# Patient Record
Sex: FEMALE | Race: WHITE | NOT HISPANIC OR LATINO | Employment: PART TIME | URBAN - METROPOLITAN AREA
[De-identification: names, ages, dates, MRNs, and addresses within clinical notes are randomized per-mention and may not be internally consistent; named-entity substitution may affect disease eponyms.]

---

## 2017-03-18 ENCOUNTER — HOSPITAL ENCOUNTER (EMERGENCY)
Facility: HOSPITAL | Age: 32
Discharge: HOME | End: 2017-03-18
Attending: EMERGENCY MEDICINE | Admitting: EMERGENCY MEDICINE

## 2017-03-18 VITALS
HEART RATE: 65 BPM | BODY MASS INDEX: 17.72 KG/M2 | SYSTOLIC BLOOD PRESSURE: 118 MMHG | TEMPERATURE: 98 F | OXYGEN SATURATION: 99 % | WEIGHT: 100 LBS | RESPIRATION RATE: 16 BRPM | HEIGHT: 63 IN | DIASTOLIC BLOOD PRESSURE: 78 MMHG

## 2017-03-18 DIAGNOSIS — K13.70 ORAL LESION: Primary | ICD-10-CM

## 2017-03-18 PROCEDURE — 99283 EMERGENCY DEPT VISIT LOW MDM: CPT

## 2017-03-18 NOTE — ED PROVIDER NOTES
" EMERGENCY DEPARTMENT ENCOUNTER    CHIEF COMPLAINT  Chief Complaint: Mouth Lesions  History given by: Patient  History limited by:   Room Number: 02/02  PMD: GISELE Buckner      HPI:  Pt is a 32 y.o. female who presents complaining of mouth lesions that onset a week ago and is sublingual. Pt states the pain is worse with eating, talking, or movement of the tongue. Pt denies any fever or chills. She is scheduled to follow up with dentist in 2 days, but states the pain has become worse.    Duration:  1 week  Onset: gradual  Timing: constant  Location: sublingual  Radiation: none  Quality: \"sore under tongue\"  Intensity/Severity: moderate  Progression: unchanged  Associated Symptoms: none  Aggravating Factors: chewing, talking  Alleviating Factors: none  Previous Episodes: none  Treatment before arrival: none    PAST MEDICAL HISTORY  Active Ambulatory Problems     Diagnosis Date Noted   • No Active Ambulatory Problems     Resolved Ambulatory Problems     Diagnosis Date Noted   • No Resolved Ambulatory Problems     Past Medical History:   Diagnosis Date   • Kidney stones        PAST SURGICAL HISTORY  Past Surgical History:   Procedure Laterality Date   • NEPHRECTOMY         FAMILY HISTORY  Family History   Problem Relation Age of Onset   • Depression Mother    • Kidney disease Mother        SOCIAL HISTORY  Social History     Social History   • Marital status: Single     Spouse name: N/A   • Number of children: 2   • Years of education: N/A     Occupational History   • Not on file.     Social History Main Topics   • Smoking status: Current Every Day Smoker     Packs/day: 1.00   • Smokeless tobacco: Not on file   • Alcohol use Not on file      Comment: Occ.   • Drug use: No   • Sexual activity: Not on file     Other Topics Concern   • Not on file     Social History Narrative   • No narrative on file       ALLERGIES  Penicillins and Shellfish-derived products    REVIEW OF SYSTEMS  Review of Systems   Constitutional: " Negative.    HENT: Positive for mouth sores.    Respiratory: Negative.    Cardiovascular: Negative.    Gastrointestinal: Negative.    Genitourinary: Negative.    All other systems reviewed and are negative.      PHYSICAL EXAM  ED Triage Vitals   Temp Heart Rate Resp BP SpO2   03/18/17 1801 03/18/17 1801 03/18/17 1801 03/18/17 1824 03/18/17 1801   97.5 °F (36.4 °C) 75 18 124/68 99 %      Temp src Heart Rate Source Patient Position BP Location FiO2 (%)   -- -- -- -- --              Physical Exam   Constitutional: She is oriented to person, place, and time and well-developed, well-nourished, and in no distress.   HENT:   1cm papular region at the posterior tongue near the frenulum    Eyes: EOM are normal.   Neck: Normal range of motion.   Cardiovascular: Normal rate and regular rhythm.    Pulmonary/Chest: Effort normal and breath sounds normal. No respiratory distress.   Neurological: She is alert and oriented to person, place, and time. She has normal sensation and normal strength.   Skin: Skin is warm and dry.   Psychiatric: Affect normal.   Nursing note and vitals reviewed.    PROCEDURES  Procedures      PROGRESS AND CONSULTS  ED Course   8:28 PM  Informed pt of her need to follow up with DMD for further evaluation. Will give pain meds till she can follow up. Pt understands and agrees with plan. All concerns addressed   8:39 PM  Discussed pt with Dr. Vazquez. Dr. Vazquez has seen and evaluated pt and agrees with tx plan.       MEDICAL DECISION MAKING      MDM  Number of Diagnoses or Management Options  Oral lesion:          DIAGNOSIS  Final diagnoses:   Oral lesion       DISPOSITION  DISCHARGE    Patient discharged in stable condition.    Reviewed implications of results, diagnosis, meds, responsibility to follow up, warning signs and symptoms of possible worsening, potential complications and reasons to return to ER.    Patient/Family voiced understanding of above instructions.    Discussed plan for discharge, as  there is no emergent indication for admission.  Pt/family is agreeable and understands need for follow up and repeat testing.  Pt is aware that discharge does not mean that nothing is wrong but it indicates no emergency is present that requires admission and they must continue care with follow-up as given below or physician of their choice.     FOLLOW-UP  Your Dentist    Go in 2 days  As scheduled         Medication List      New Prescriptions          Miracle mouthwash oral suspension   Swish and spit 15 mL Every 4 (Four) Hours As Needed for stomatitis.         Stop          norgestimate-ethinyl estradiol 0.18/0.215/0.25 MG-35 MCG per tablet   Commonly known as:  ORTHO TRI-CYCLEN,TRINESSA       nystatin 074120 UNIT/GM cream   Commonly known as:  MYCOSTATIN               Latest Documented Vital Signs:  As of 5:39 AM  BP- 118/78 HR- 65 Temp- 98 °F (36.7 °C) O2 sat- 99%    --  Documentation assistance provided by melanie Archuleta for Ac Reid PA-C.  Information recorded by the scribe was done at my direction and has been verified and validated by me.       Joe Archuleta  03/18/17 2029       Joe Archuleta  03/18/17 2040       NASREEN Lugo III  03/23/17 [id number]

## 2017-03-18 NOTE — ED NOTES
Pt states she has a sore under her tongue that has been there for 1 week and increasing in pain     Mayo Blue RN  03/18/17 [id number]

## 2017-03-19 NOTE — ED PROVIDER NOTES
History    The patient presents complaining of mouth lesions which she initially noticed one week ago. She states that the pain is exacerbated with eating or talking and relieved with rest. Patient denies any fever since onset. She has follow up with her Dentist in two days.     On physical exam, slight irritation of the gum line of the mandibular teeth.      I supervised care provided by the midlevel provider.  We have discussed this patient's history, physical exam, and treatment plan. I have reviewed the note and personally saw and examined the patient and agree with the plan of care.    Documentation assistance provided by melanie Brown for .  Information recorded by the melanie was done at my direction and has been verified and validated by me.       Manolo Brwon  03/18/17 0379       Xavier Vazquez MD  03/18/17 [id number]

## 2017-03-19 NOTE — DISCHARGE INSTRUCTIONS
Return to the ER with any further concerns.  You may take OTC Ibuprofen in addition to the Magic Mouthwash.  Follow up with your dentist Monday as directed.

## 2017-03-22 ENCOUNTER — TELEPHONE (OUTPATIENT)
Dept: SOCIAL WORK | Facility: HOSPITAL | Age: 32
End: 2017-03-22

## 2017-03-22 NOTE — TELEPHONE ENCOUNTER
ED follow-up call. States she is using mouthwash as prescribed, feeling better and followed up w/ dentist

## 2017-08-08 ENCOUNTER — OFFICE VISIT (OUTPATIENT)
Dept: OBSTETRICS AND GYNECOLOGY | Facility: CLINIC | Age: 32
End: 2017-08-08

## 2017-08-08 ENCOUNTER — PROCEDURE VISIT (OUTPATIENT)
Dept: OBSTETRICS AND GYNECOLOGY | Facility: CLINIC | Age: 32
End: 2017-08-08

## 2017-08-08 VITALS
HEIGHT: 63 IN | DIASTOLIC BLOOD PRESSURE: 64 MMHG | BODY MASS INDEX: 17.58 KG/M2 | WEIGHT: 99.2 LBS | SYSTOLIC BLOOD PRESSURE: 108 MMHG

## 2017-08-08 DIAGNOSIS — N91.2 ABSENT MENSES: Primary | ICD-10-CM

## 2017-08-08 DIAGNOSIS — N89.8 VAGINAL DISCHARGE: ICD-10-CM

## 2017-08-08 DIAGNOSIS — N91.2 AMENORRHEA: Primary | ICD-10-CM

## 2017-08-08 LAB
B-HCG UR QL: NEGATIVE
BILIRUB BLD-MCNC: NEGATIVE MG/DL
GLUCOSE UR STRIP-MCNC: NEGATIVE MG/DL
INTERNAL NEGATIVE CONTROL: NEGATIVE
INTERNAL POSITIVE CONTROL: POSITIVE
KETONES UR QL: NEGATIVE
LEUKOCYTE EST, POC: NEGATIVE
Lab: NORMAL
NITRITE UR-MCNC: NEGATIVE MG/ML
PH UR: 7 [PH] (ref 5–8)
PROT UR STRIP-MCNC: NEGATIVE MG/DL
RBC # UR STRIP: ABNORMAL /UL
SP GR UR: 1.01 (ref 1–1.03)
UROBILINOGEN UR QL: NORMAL

## 2017-08-08 PROCEDURE — 99214 OFFICE O/P EST MOD 30 MIN: CPT | Performed by: NURSE PRACTITIONER

## 2017-08-08 PROCEDURE — 76830 TRANSVAGINAL US NON-OB: CPT | Performed by: NURSE PRACTITIONER

## 2017-08-08 PROCEDURE — 81025 URINE PREGNANCY TEST: CPT | Performed by: NURSE PRACTITIONER

## 2017-08-08 NOTE — PROGRESS NOTES
Subjective   Eula Yi is a 32 y.o. female presents with c/o missed period    History of Present Illness  Eula reports her last period was in May. She states she did stop taking OCPs around the same time as she was no longer sexually active and they were causing her nausea. She reports monthly withdrawal bleed on OCPs each month. Denies pelvic pain. Reports some cramping and acne. She has not been sexually active in several months but has taken several negative home pregnancy tests. Last pap ASCUS, HPV  Neg November 2016.    The following portions of the patient's history were reviewed and updated as appropriate: allergies, current medications, past family history, past medical history, past social history, past surgical history and problem list.    Review of Systems   Constitutional: Negative for chills, fatigue and fever.   Gastrointestinal: Negative for abdominal distention and abdominal pain.   Genitourinary: Positive for menstrual problem and vaginal discharge. Negative for difficulty urinating, dyspareunia, dysuria, flank pain, frequency, genital sores, pelvic pain, vaginal bleeding and vaginal pain.     Objective   Physical Exam  External genitalia WNL- negative for lesions, swelling, and skin discolorations  Vagina WNL- clear, no lesions  Cervix WNL- no lesions, discharge, or CMT  Uterus NSSC  freely mobile, nontender  Adnexa WNL- no masses or tenderness      Assessment/Plan   Eula was seen today for amenorrhea.    Diagnoses and all orders for this visit:    Amenorrhea  -     POC Pregnancy, Urine  -     POC Urinalysis Dipstick  -     TSH  -     Follicle Stimulating Hormone  -     Prolactin  -     HCG, B-subunit, Quantitative    Vaginal discharge  -     NuSwab VG+        Await labs and U/S. Pt to call in 1 week to discuss. Pt plans to continue abstinence for now.     Jimena Christianson, APRN

## 2017-08-09 LAB
FSH SERPL-ACNC: 5.7 MIU/ML
HCG INTACT+B SERPL-ACNC: <0.5 MIU/ML
PROLACTIN SERPL-MCNC: 55.7 NG/ML (ref 4.8–23.3)
TSH SERPL DL<=0.005 MIU/L-ACNC: 1 MIU/ML (ref 0.27–4.2)

## 2017-08-11 LAB
A VAGINAE DNA VAG QL NAA+PROBE: ABNORMAL SCORE
BVAB2 DNA VAG QL NAA+PROBE: ABNORMAL SCORE
C ALBICANS DNA VAG QL NAA+PROBE: NEGATIVE
C GLABRATA DNA VAG QL NAA+PROBE: NEGATIVE
C TRACH RRNA SPEC QL NAA+PROBE: NEGATIVE
MEGA1 DNA VAG QL NAA+PROBE: ABNORMAL SCORE
N GONORRHOEA RRNA SPEC QL NAA+PROBE: NEGATIVE
T VAGINALIS RRNA SPEC QL NAA+PROBE: NEGATIVE

## 2017-08-16 ENCOUNTER — TELEPHONE (OUTPATIENT)
Dept: OBSTETRICS AND GYNECOLOGY | Facility: CLINIC | Age: 32
End: 2017-08-16

## 2017-08-16 RX ORDER — METRONIDAZOLE 7.5 MG/G
GEL TOPICAL NIGHTLY
Qty: 45 G | Refills: 0 | Status: SHIPPED | OUTPATIENT
Start: 2017-08-16 | End: 2017-08-18 | Stop reason: SDUPTHER

## 2017-08-16 NOTE — TELEPHONE ENCOUNTER
Spoke to Eula. Informed + BV on culture and metrogel sent to pharm. Discussed labwork and ultrasound results. Recommend follow up with Dr. Mccoy to further discuss findings and amenorrhea. Probable repeat prolactin level at that time. Eula verbalizes understanding and agrees with plan. She will call to schedule appt with Dr. Mccoy tomorrow.

## 2017-08-18 RX ORDER — METRONIDAZOLE 500 MG/1
500 TABLET ORAL 2 TIMES DAILY
Qty: 14 TABLET | Refills: 0 | Status: SHIPPED | OUTPATIENT
Start: 2017-08-18 | End: 2017-08-25

## 2017-08-29 ENCOUNTER — OFFICE VISIT (OUTPATIENT)
Dept: OBSTETRICS AND GYNECOLOGY | Facility: CLINIC | Age: 32
End: 2017-08-29

## 2017-08-29 VITALS
HEIGHT: 63 IN | DIASTOLIC BLOOD PRESSURE: 58 MMHG | WEIGHT: 97.2 LBS | SYSTOLIC BLOOD PRESSURE: 100 MMHG | BODY MASS INDEX: 17.22 KG/M2

## 2017-08-29 DIAGNOSIS — E23.0: Primary | ICD-10-CM

## 2017-08-29 PROCEDURE — 99213 OFFICE O/P EST LOW 20 MIN: CPT | Performed by: OBSTETRICS & GYNECOLOGY

## 2017-08-29 NOTE — PROGRESS NOTES
Subjective   Eula Yi is a 32 y.o. female here for follow-up regarding amenorrhea     History of Present Illness    32-year-old multiparous white female presents after being evaluated for amenorrhea.  She also had evidence of bacterial vaginosis which was treated.  Her symptoms have improved.  Her prolactin level was 55.  Her TSH and FSH were normal.  She denies galactorrhea and currently is on no medications.    The following portions of the patient's history were reviewed and updated as appropriate: allergies, current medications, past family history, past medical history, past social history, past surgical history and problem list.    Review of Systems   Gastrointestinal: Negative for abdominal distention and abdominal pain.   Genitourinary: Positive for menstrual problem and vaginal discharge.   Neurological: Negative for headaches.       Objective   Physical Exam   She is alert and conversant and in no acute distress.  She is oriented to time and place.  Her abdomen is soft, flat and nontender.  No masses were appreciated.  The vulva and perineum are without lesion.  The vagina is without discharge or bleeding.  The cervix is without lesion and nontender to motion.  The uterus is anteverted and normal size and shape.  The adnexa are without mass or tenderness.    Assessment/Plan   Eula was seen today for follow-up.    Diagnoses and all orders for this visit:    Amenorrhea-hyperprolactinemia syndrome  -     Prolactin     We will recheck her prolactin level and if it still is elevated I recommended evaluation of her pituitary gland with either a CT scan or MRI.  She will call for results.  She is not interested in pregnancy and is considering permanent sterilization.

## 2017-08-30 LAB — PROLACTIN SERPL-MCNC: 30.6 NG/ML (ref 4.8–23.3)

## 2017-11-22 ENCOUNTER — OFFICE VISIT (OUTPATIENT)
Dept: OBSTETRICS AND GYNECOLOGY | Facility: CLINIC | Age: 32
End: 2017-11-22

## 2017-11-22 VITALS
DIASTOLIC BLOOD PRESSURE: 80 MMHG | SYSTOLIC BLOOD PRESSURE: 120 MMHG | HEIGHT: 63 IN | WEIGHT: 97 LBS | BODY MASS INDEX: 17.19 KG/M2

## 2017-11-22 DIAGNOSIS — Z01.411 ENCOUNTER FOR GYNECOLOGICAL EXAMINATION WITH ABNORMAL FINDING: Primary | ICD-10-CM

## 2017-11-22 DIAGNOSIS — B37.31 CANDIDA VAGINITIS: ICD-10-CM

## 2017-11-22 DIAGNOSIS — E23.0: ICD-10-CM

## 2017-11-22 PROCEDURE — 99213 OFFICE O/P EST LOW 20 MIN: CPT | Performed by: OBSTETRICS & GYNECOLOGY

## 2017-11-22 PROCEDURE — 99395 PREV VISIT EST AGE 18-39: CPT | Performed by: OBSTETRICS & GYNECOLOGY

## 2017-11-22 RX ORDER — FLUCONAZOLE 150 MG/1
150 TABLET ORAL DAILY
Qty: 1 TABLET | Refills: 1 | Status: SHIPPED | OUTPATIENT
Start: 2017-11-22 | End: 2018-03-06 | Stop reason: SDUPTHER

## 2017-11-22 NOTE — PROGRESS NOTES
Subjective     Eula Yi is a 32 y.o. female for annual gyn exam    History of Present Illness   32-year-old white female  3 para 2 presents for annual exam.  She has not had a menstrual cycle in over 8 months.  She was found to have an elevated prolactin level which had decreased from 55-30.  She denies galactorrhea.  She quit her oral contraceptives as she is not sexually active.  There is no family history of breast cancer.  She has no complaints.      The following portions of the patient's history were reviewed and updated as appropriate: allergies, current medications, past family history, past medical history, past social history, past surgical history and problem list.      Review of Systems   Constitutional: Negative for activity change, appetite change, fatigue and unexpected weight change.   HENT: Negative.    Eyes: Negative.    Respiratory: Negative.    Cardiovascular: Negative.    Gastrointestinal: Negative for abdominal distention, abdominal pain, anal bleeding, constipation, diarrhea, nausea and vomiting.   Endocrine: Negative for cold intolerance, heat intolerance, polydipsia, polyphagia and polyuria.   Genitourinary: Positive for menstrual problem. Negative for difficulty urinating, dysuria, flank pain, frequency, hematuria and urgency.   Musculoskeletal: Negative.    Skin: Negative.    Allergic/Immunologic: Negative.    Neurological: Negative.    Hematological: Negative.    Psychiatric/Behavioral: Negative.        Objective     Physical Exam   Constitutional: She is oriented to person, place, and time. Vital signs are normal. She appears well-developed and well-nourished. She is cooperative.   HENT:   Head: Normocephalic.   Eyes: Conjunctivae are normal. Pupils are equal, round, and reactive to light.   Neck: Normal range of motion. Neck supple. No tracheal deviation present. No thyromegaly present.   Cardiovascular: Normal rate, regular rhythm and normal heart sounds.  Exam reveals  no gallop and no friction rub.    No murmur heard.  Pulmonary/Chest: Effort normal and breath sounds normal. No respiratory distress. She has no wheezes. Right breast exhibits no inverted nipple, no mass, no nipple discharge, no skin change and no tenderness. Left breast exhibits no inverted nipple, no mass, no nipple discharge, no skin change and no tenderness. Breasts are symmetrical. There is no breast swelling.   Abdominal: Soft. Bowel sounds are normal. She exhibits no distension, no ascites and no mass. There is no hepatosplenomegaly. There is no tenderness. There is no rebound, no guarding and no CVA tenderness. No hernia. Hernia confirmed negative in the right inguinal area and confirmed negative in the left inguinal area.   Genitourinary: Pelvic exam was performed with patient supine. No labial fusion. There is no rash, tenderness, lesion or injury on the right labia. There is no rash, tenderness, lesion or injury on the left labia. Uterus is not deviated, not enlarged, not fixed and not tender. Cervix exhibits no motion tenderness, no discharge and no friability. Right adnexum displays no mass, no tenderness and no fullness. Left adnexum displays no mass, no tenderness and no fullness. No erythema, tenderness or bleeding in the vagina. No foreign body in the vagina. No signs of injury around the vagina. Vaginal discharge found.   Genitourinary Comments: There is a white, clumpy discharge consistent with Candida.   Musculoskeletal: Normal range of motion. She exhibits no edema or deformity.   Lymphadenopathy:     She has no cervical adenopathy.        Right: No inguinal adenopathy present.        Left: No inguinal adenopathy present.   Neurological: She is alert and oriented to person, place, and time. She has normal reflexes. No cranial nerve deficit. Coordination normal.   Skin: Skin is warm and dry. No rash noted. No erythema.   Psychiatric: She has a normal mood and affect. Her behavior is normal.          Eula was seen today for gynecologic exam.    Diagnoses and all orders for this visit:    Encounter for gynecological examination with abnormal finding  -     Pap IG, Rfx HPV ASCU - ThinPrep Vial, Cervix    Amenorrhea-hyperprolactinemia syndrome  -     Prolactin    Candida vaginitis  -     fluconazole (DIFLUCAN) 150 MG tablet; Take 1 tablet by mouth Daily.    The patient is doing well.  I encouraged tobacco cessation.  A regular diet and regular exercise were encouraged.  We will recheck her prolactin level.  I discussed possible bromocriptine therapy.  The patient is not currently sexually active and does not desire pregnancy.  She will call for test results.

## 2017-11-23 LAB — PROLACTIN SERPL-MCNC: 21.1 NG/ML (ref 4.8–23.3)

## 2017-11-25 LAB
CONV .: NORMAL
CYTOLOGIST CVX/VAG CYTO: NORMAL
CYTOLOGY CVX/VAG DOC THIN PREP: NORMAL
DX ICD CODE: NORMAL
HIV 1 & 2 AB SER-IMP: NORMAL
OTHER STN SPEC: NORMAL
PATH REPORT.FINAL DX SPEC: NORMAL
STAT OF ADQ CVX/VAG CYTO-IMP: NORMAL

## 2017-12-27 ENCOUNTER — TELEPHONE (OUTPATIENT)
Dept: OBSTETRICS AND GYNECOLOGY | Facility: CLINIC | Age: 32
End: 2017-12-27

## 2017-12-27 NOTE — TELEPHONE ENCOUNTER
Returned patient's call regarding her mychart message. She is currently at the [facility] for evaluation and workup of her symptoms.

## 2018-03-06 DIAGNOSIS — B37.31 CANDIDA VAGINITIS: ICD-10-CM

## 2018-03-07 RX ORDER — FLUCONAZOLE 150 MG/1
TABLET ORAL
Qty: 1 TABLET | Refills: 0 | Status: SHIPPED | OUTPATIENT
Start: 2018-03-07 | End: 2019-08-01 | Stop reason: SDUPTHER

## 2018-05-16 ENCOUNTER — OFFICE VISIT (OUTPATIENT)
Dept: OBSTETRICS AND GYNECOLOGY | Facility: CLINIC | Age: 33
End: 2018-05-16

## 2018-05-16 VITALS
BODY MASS INDEX: 17.54 KG/M2 | HEIGHT: 63 IN | DIASTOLIC BLOOD PRESSURE: 80 MMHG | SYSTOLIC BLOOD PRESSURE: 138 MMHG | WEIGHT: 99 LBS

## 2018-05-16 DIAGNOSIS — R39.9 UTI SYMPTOMS: Primary | ICD-10-CM

## 2018-05-16 DIAGNOSIS — B37.31 VAGINITIS DUE TO CANDIDA: ICD-10-CM

## 2018-05-16 LAB
BILIRUB BLD-MCNC: NEGATIVE MG/DL
GLUCOSE UR STRIP-MCNC: NEGATIVE MG/DL
KETONES UR QL: NEGATIVE
LEUKOCYTE EST, POC: NEGATIVE
NITRITE UR-MCNC: NEGATIVE MG/ML
PH UR: 7.5 [PH] (ref 5–8)
PROT UR STRIP-MCNC: NEGATIVE MG/DL
RBC # UR STRIP: NEGATIVE /UL
SP GR UR: 1.02 (ref 1–1.03)
UROBILINOGEN UR QL: NORMAL

## 2018-05-16 PROCEDURE — 99213 OFFICE O/P EST LOW 20 MIN: CPT | Performed by: OBSTETRICS & GYNECOLOGY

## 2018-05-16 RX ORDER — FLUCONAZOLE 150 MG/1
150 TABLET ORAL DAILY
Qty: 1 TABLET | Refills: 1 | Status: SHIPPED | OUTPATIENT
Start: 2018-05-16 | End: 2019-08-01 | Stop reason: SDUPTHER

## 2018-05-16 RX ORDER — LEVOTHYROXINE SODIUM 0.03 MG/1
25 TABLET ORAL DAILY
COMMUNITY
End: 2021-05-17

## 2018-05-16 NOTE — PROGRESS NOTES
Subjective   Eula Yi is a 33 y.o. female complaining of vaginal itching and discharge     History of Present Illness    33-year-old multiparous white female presents complaining of a vaginal discharge and itching after having intercourse and a condom breaking.  She is currently on oral contraceptives for pituitary dysfunction and is having regular menstrual cycles.  She is also having some urinary symptoms.  A urine dipstick here in the office was negative.    The following portions of the patient's history were reviewed and updated as appropriate: allergies, current medications, past family history, past medical history, past social history, past surgical history and problem list.    Review of Systems   Gastrointestinal: Negative for abdominal pain.   Genitourinary: Positive for urgency and vaginal discharge. Negative for pelvic pain and vaginal bleeding.       Objective   Physical Exam   She is alert and conversant and in no acute distress.  The abdomen is soft, flat and nontender.  No masses were palpable.  The vulva and perineum are without lesion.  The vagina has a thick white discharge consistent with Candida.  The cervix is grossly normal and nontender to motion.  No lesions were seen.  The uterus is anteverted and normal size and shape and nontender to motion.  The adnexa are without mass or tenderness.    Assessment/Plan   Eula was seen today for vaginal itching and urinary frequency.    Diagnoses and all orders for this visit:    UTI symptoms  -     POC Urinalysis Dipstick    Vaginitis due to Candida  -     fluconazole (DIFLUCAN) 150 MG tablet; Take 1 tablet by mouth Daily.  -     NuSwab VG+ - Swab, Vagina     E discussed her symptoms and the findings.  She will start Diflucan therapy and will call for testing results.

## 2018-05-23 LAB
A VAGINAE DNA VAG QL NAA+PROBE: NORMAL SCORE
BVAB2 DNA VAG QL NAA+PROBE: NORMAL SCORE
C ALBICANS DNA VAG QL NAA+PROBE: NEGATIVE
C GLABRATA DNA VAG QL NAA+PROBE: NEGATIVE
C TRACH RRNA SPEC QL NAA+PROBE: NEGATIVE
MEGA1 DNA VAG QL NAA+PROBE: NORMAL SCORE
N GONORRHOEA RRNA SPEC QL NAA+PROBE: NEGATIVE
T VAGINALIS RRNA SPEC QL NAA+PROBE: NEGATIVE

## 2018-10-02 ENCOUNTER — TELEPHONE (OUTPATIENT)
Dept: OBSTETRICS AND GYNECOLOGY | Facility: CLINIC | Age: 33
End: 2018-10-02

## 2018-10-02 NOTE — TELEPHONE ENCOUNTER
PT called, needs refill of boric acid capsules 600mg. Pharmacy has been sending a refill request.

## 2018-10-03 ENCOUNTER — TELEPHONE (OUTPATIENT)
Dept: OBSTETRICS AND GYNECOLOGY | Facility: CLINIC | Age: 33
End: 2018-10-03

## 2018-10-03 NOTE — TELEPHONE ENCOUNTER
PHARMACIST FROM [facility]. PHARMACY CALLED TO CHECK TO SEE IF YOU HAVE RECEIVED REFILL REQUEST FOR BORIC ACID VAGINAL CAPSUL, 600 MG.  COULD YOU PLEASE REFILL, OR DOES PT NEED TO BE SEEN.  PLEASE ADVISE.

## 2018-10-23 ENCOUNTER — OFFICE VISIT (OUTPATIENT)
Dept: OBSTETRICS AND GYNECOLOGY | Facility: CLINIC | Age: 33
End: 2018-10-23

## 2018-10-23 VITALS
DIASTOLIC BLOOD PRESSURE: 70 MMHG | HEIGHT: 63 IN | WEIGHT: 99 LBS | SYSTOLIC BLOOD PRESSURE: 100 MMHG | BODY MASS INDEX: 17.54 KG/M2

## 2018-10-23 DIAGNOSIS — N76.0 BACTERIAL VAGINOSIS: ICD-10-CM

## 2018-10-23 DIAGNOSIS — B96.89 BACTERIAL VAGINOSIS: ICD-10-CM

## 2018-10-23 DIAGNOSIS — Z01.419 WELL WOMAN EXAM WITH ROUTINE GYNECOLOGICAL EXAM: Primary | ICD-10-CM

## 2018-10-23 PROCEDURE — 99213 OFFICE O/P EST LOW 20 MIN: CPT | Performed by: NURSE PRACTITIONER

## 2018-10-23 PROCEDURE — 99395 PREV VISIT EST AGE 18-39: CPT | Performed by: NURSE PRACTITIONER

## 2018-10-23 RX ORDER — METRONIDAZOLE 500 MG/1
500 TABLET ORAL 2 TIMES DAILY
Qty: 14 TABLET | Refills: 0 | Status: SHIPPED | OUTPATIENT
Start: 2018-10-23 | End: 2018-10-30

## 2018-10-23 NOTE — PROGRESS NOTES
[facility], [facility]  Routine Annual Visit    10/23/2018    Patient: Eula Yi          MR#:[id number]      History of Present Illness    33 y.o. female  who presents with complaint of vaginal discharge and with request for pap smear today although one month early. Last pap negative 2017. Eula is seeing endocrinology for thyroid dysfunction and amenorrhea. Her cycle is currently controlled on OCPs. She c/o vaginal discharge and odor although much improved after boric acid therapy. Does request STI testing    No LMP recorded.  Obstetric History:  OB History      Para Term  AB Living    3 2 2     2    SAB TAB Ectopic Molar Multiple Live Births                        Menstrual History:     No LMP recorded.       Sexual History:       ________________________________________  Patient Active Problem List   Diagnosis   • Amenorrhea-hyperprolactinemia syndrome (CMS/HCC)       Past Medical History:   Diagnosis Date   • Amenorrhea    • Disease of thyroid gland    • Kidney stones        Past Surgical History:   Procedure Laterality Date   • KIDNEY STONE SURGERY     • NEPHRECTOMY     • SINUS SURGERY     • WISDOM TOOTH EXTRACTION         History   Smoking Status   • Current Every Day Smoker   • Packs/day: 1.00   Smokeless Tobacco   • Not on file       Family History   Problem Relation Age of Onset   • Depression Mother    • Kidney disease Mother    • Breast cancer Maternal Grandmother        Prior to Admission medications    Medication Sig Start Date End Date Taking? Authorizing Provider   levothyroxine (SYNTHROID, LEVOTHROID) 25 MCG tablet Take 25 mcg by mouth Daily.   Yes Provider, MD Milton   SPRINTEC 28 0.25-35 MG-MCG per tablet TAKE ONE TABLET BY MOUTH DAILY 16  Yes Patrick Mccoy MD   fluconazole (DIFLUCAN) 150 MG tablet TAKE ONE TABLET BY MOUTH IN A SINGLE DOSE 3/7/18   Patrick Mccoy MD   fluconazole (DIFLUCAN) 150 MG tablet Take 1 tablet by mouth Daily.  "5/16/18   Patrick Mccoy MD   metroNIDAZOLE (FLAGYL) 500 MG tablet Take 1 tablet by mouth 2 (Two) Times a Day for 7 days. Avoid alcohol during and 24 hours following therapy. 10/23/18 10/30/18  Jimena Christianson APRN   nystatin (MYCOSTATIN) 401599 UNIT/GM cream  10/31/16   ProviderMilton MD     ________________________________________  The following portions of the patient's history were reviewed and updated as appropriate: allergies, current medications, past family history, past medical history, past social history, past surgical history and problem list.    Review of Systems   Constitutional: Negative.    HENT: Negative.    Eyes: Negative for visual disturbance.   Respiratory: Negative for cough, shortness of breath and wheezing.    Cardiovascular: Negative for chest pain, palpitations and leg swelling.   Gastrointestinal: Negative for abdominal distention, abdominal pain, blood in stool, constipation, diarrhea, nausea and vomiting.   Endocrine: Negative for cold intolerance and heat intolerance.   Genitourinary: Negative for difficulty urinating, dyspareunia, dysuria, frequency, genital sores, hematuria, menstrual problem, pelvic pain, urgency, vaginal bleeding, vaginal discharge and vaginal pain.   Musculoskeletal: Negative.    Skin: Negative.    Neurological: Negative for dizziness, weakness, light-headedness, numbness and headaches.   Hematological: Negative.    Psychiatric/Behavioral: Negative.    Breasts: negative for lumps skin changes, dimpling, swelling, nipple changes/discharge bilaterally           Objective   Physical Exam    /70   Ht 160 cm (62.99\")   Wt 44.9 kg (99 lb)   BMI 17.54 kg/m²    BP Readings from Last 3 Encounters:   10/23/18 100/70   05/16/18 138/80   11/22/17 120/80      Wt Readings from Last 3 Encounters:   10/23/18 44.9 kg (99 lb)   05/16/18 44.9 kg (99 lb)   11/22/17 44 kg (97 lb)        BMI: Estimated body mass index is 17.54 kg/m² as calculated from the " "following:    Height as of this encounter: 160 cm (62.99\").    Weight as of this encounter: 44.9 kg (99 lb).       General:   alert, appears stated age and cooperative   Heart: regular rate and rhythm, S1, S2 normal, no murmur, click, rub or gallop   Lungs: clear to auscultation bilaterally   Abdomen: soft, non-tender, without masses or organomegaly   Breast: inspection negative, no nipple discharge or bleeding, no masses or nodularity palpable   Vulva: normal   Vagina: normal mucosa, thin grey discharge   Cervix: no cervical motion tenderness and no lesions   Uterus: normal size, mobile, non-tender or normal shape and consistency   Adnexa: no mass, fullness, tenderness     Assessment:    1. Normal annual exam   2. Vaginal discharge- treat with metronidazole while awaiting culture  3.  Counseled on healthy lifestyle modifications, safe sex, condom use, and self breast exams.      Plan:    []  Rx:   []  Mammogram request made  []  PAP done  []  Occult fecal blood test (Insure)  []  Labs:   []  GC/Chl/TV  []  DEXA scan   []  Referral for colonoscopy:           Jimena Christianson, GISELE  10/23/2018 1:51 PM  "

## 2018-10-26 ENCOUNTER — TELEPHONE (OUTPATIENT)
Dept: OBSTETRICS AND GYNECOLOGY | Facility: CLINIC | Age: 33
End: 2018-10-26

## 2018-10-26 LAB
A VAGINAE DNA VAG QL NAA+PROBE: ABNORMAL SCORE
BVAB2 DNA VAG QL NAA+PROBE: ABNORMAL SCORE
C ALBICANS DNA VAG QL NAA+PROBE: NEGATIVE
C GLABRATA DNA VAG QL NAA+PROBE: NEGATIVE
C TRACH RRNA SPEC QL NAA+PROBE: NEGATIVE
CYTOLOGIST CVX/VAG CYTO: ABNORMAL
CYTOLOGY CVX/VAG DOC THIN PREP: ABNORMAL
DX ICD CODE: ABNORMAL
HIV 1 & 2 AB SER-IMP: ABNORMAL
HPV I/H RISK 4 DNA CVX QL PROBE+SIG AMP: POSITIVE
MEGA1 DNA VAG QL NAA+PROBE: ABNORMAL SCORE
N GONORRHOEA RRNA SPEC QL NAA+PROBE: NEGATIVE
OTHER STN SPEC: ABNORMAL
PATH REPORT.FINAL DX SPEC: ABNORMAL
STAT OF ADQ CVX/VAG CYTO-IMP: ABNORMAL
T VAGINALIS RRNA SPEC QL NAA+PROBE: NEGATIVE

## 2018-10-26 NOTE — TELEPHONE ENCOUNTER
LVM informing pt that culture returned pos for BV as they suspected and that all other infections were neg. Told pt flagyl that was prescribed was the correct treatment.   ----- Message from GISELE Burton sent at 10/26/2018  8:46 AM EDT -----  Please inform pt BV returned positive on her culture as we suspected- all other infections negative. Flagyl prescribed after her visit was the appropriate treatment

## 2018-10-29 ENCOUNTER — TELEPHONE (OUTPATIENT)
Dept: OBSTETRICS AND GYNECOLOGY | Facility: CLINIC | Age: 33
End: 2018-10-29

## 2018-10-29 NOTE — TELEPHONE ENCOUNTER
Spoke with pt informed of pap coming back negative for any precancerous cells, but that the HPV was pos. Informed pt of all comments below per Jimena Christianson and how important it is to repeat her pap in a year. Pt stated understanding.   ----- Message from GISELE Burton sent at 10/29/2018 10:51 AM EDT -----  Please inform pt her PAP test was negative for any precancerous cell changes. A test for HPV (human papilloma virus) was also ran. This returned positive. This is a very common virus that nearly all sexually active women get at some point in their lives. It can cause cancer of the cervix or more commonly precancerous changes. It takes years for these changes to occur. In most cases, the immune system clears the virus from the genital tract in 12-24 months. It is important that you have your pap smear and this test repeated in 1 year.

## 2019-04-01 ENCOUNTER — OFFICE VISIT (OUTPATIENT)
Dept: OBSTETRICS AND GYNECOLOGY | Facility: CLINIC | Age: 34
End: 2019-04-01

## 2019-04-01 VITALS
DIASTOLIC BLOOD PRESSURE: 70 MMHG | BODY MASS INDEX: 18.07 KG/M2 | WEIGHT: 102 LBS | HEIGHT: 63 IN | SYSTOLIC BLOOD PRESSURE: 100 MMHG

## 2019-04-01 DIAGNOSIS — N89.8 VAGINAL DISCHARGE: Primary | ICD-10-CM

## 2019-04-01 PROCEDURE — 99213 OFFICE O/P EST LOW 20 MIN: CPT | Performed by: OBSTETRICS & GYNECOLOGY

## 2019-04-01 NOTE — PROGRESS NOTES
Subjective   Eula Yi is a 34 y.o. female with vaginal itching and discharge    History of Present Illness    34-year-old white female presents with a 3-week history of vaginal discharge with itching.  She had intercourse with her ex- and developed symptoms soon thereafter.  She has been maintained on oral contraceptive pills and started on thyroid replacement therapy due to significant pituitary dysfunction.  She has just recently started her menstrual cycle which is on time and normal.    The following portions of the patient's history were reviewed and updated as appropriate: allergies, current medications, past family history, past medical history, past social history, past surgical history and problem list.    Review of Systems   Gastrointestinal: Negative for abdominal distention and abdominal pain.   Genitourinary: Positive for vaginal bleeding and vaginal discharge. Negative for difficulty urinating, dysuria, frequency and pelvic pain.       Objective   Physical Exam   Patient is alert and conversant and in no acute distress.  The abdomen is soft, flat and nontender.  No masses were palpable.  The vulva and perineum are without lesion.  The vagina has a moderate amount of menstrual blood in the vault.  The cervix is grossly normal without lesion and nontender to motion.  The uterus is small and mobile and nontender.  The adnexa are without mass or tenderness.    Assessment/Plan   Eula was seen today for follow-up.    Diagnoses and all orders for this visit:    Vaginal discharge  -     NuSwab VG+ - Swab, Vagina    I was unable to discern the etiology of her discharge due to the vaginal bleeding.  A swab was performed and she will call for results.

## 2019-04-03 LAB
A VAGINAE DNA VAG QL NAA+PROBE: ABNORMAL SCORE
BVAB2 DNA VAG QL NAA+PROBE: ABNORMAL SCORE
C ALBICANS DNA VAG QL NAA+PROBE: NEGATIVE
C GLABRATA DNA VAG QL NAA+PROBE: NEGATIVE
C TRACH RRNA SPEC QL NAA+PROBE: NEGATIVE
MEGA1 DNA VAG QL NAA+PROBE: ABNORMAL SCORE
N GONORRHOEA RRNA SPEC QL NAA+PROBE: NEGATIVE
T VAGINALIS RRNA SPEC QL NAA+PROBE: POSITIVE

## 2019-04-04 RX ORDER — METRONIDAZOLE 500 MG/1
500 TABLET ORAL 2 TIMES DAILY
Qty: 14 TABLET | Refills: 0 | Status: SHIPPED | OUTPATIENT
Start: 2019-04-04 | End: 2019-06-10 | Stop reason: SDUPTHER

## 2019-06-12 RX ORDER — METRONIDAZOLE 500 MG/1
TABLET ORAL
Qty: 14 TABLET | Refills: 0 | Status: SHIPPED | OUTPATIENT
Start: 2019-06-12 | End: 2021-05-17

## 2019-07-18 ENCOUNTER — TELEPHONE (OUTPATIENT)
Dept: OBSTETRICS AND GYNECOLOGY | Facility: CLINIC | Age: 34
End: 2019-07-18

## 2019-07-18 NOTE — TELEPHONE ENCOUNTER
No available appointments for gyn visit till August. Patient believes she has possible uti and would like to be seen sooner. Can she be worked in ?

## 2019-07-18 NOTE — TELEPHONE ENCOUNTER
I am out of the office tomorrow and Monday and will be back until next Wednesday.  She probably needs to be seen sooner than that

## 2019-08-01 ENCOUNTER — OFFICE VISIT (OUTPATIENT)
Dept: OBSTETRICS AND GYNECOLOGY | Facility: CLINIC | Age: 34
End: 2019-08-01

## 2019-08-01 VITALS
SYSTOLIC BLOOD PRESSURE: 100 MMHG | DIASTOLIC BLOOD PRESSURE: 70 MMHG | HEIGHT: 63 IN | WEIGHT: 106 LBS | BODY MASS INDEX: 18.78 KG/M2

## 2019-08-01 DIAGNOSIS — N89.8 VAGINAL DISCHARGE: ICD-10-CM

## 2019-08-01 DIAGNOSIS — N91.2 AMENORRHEA DUE TO ORAL CONTRACEPTIVE: ICD-10-CM

## 2019-08-01 DIAGNOSIS — Z79.3 AMENORRHEA DUE TO ORAL CONTRACEPTIVE: ICD-10-CM

## 2019-08-01 DIAGNOSIS — B37.31 CANDIDA VAGINITIS: Primary | ICD-10-CM

## 2019-08-01 DIAGNOSIS — N91.2 ABSENT MENSES: ICD-10-CM

## 2019-08-01 PROCEDURE — 99213 OFFICE O/P EST LOW 20 MIN: CPT | Performed by: OBSTETRICS & GYNECOLOGY

## 2019-08-01 RX ORDER — FLUCONAZOLE 150 MG/1
150 TABLET ORAL DAILY
Qty: 1 TABLET | Refills: 1 | Status: SHIPPED | OUTPATIENT
Start: 2019-08-01 | End: 2021-05-17

## 2019-08-01 NOTE — PROGRESS NOTES
Subjective   Eula Yi is a 34 y.o. female with vaginal itching and discharge    History of Present Illness    34-year-old multiparous white female presents with a one-week history of vaginal discharge with itching.  She has noticed some urinary frequency but no dysuria.  Her symptoms began after she had intercourse and the condom broke.  She is currently using oral contraceptive pills and her last menstrual cycle consisted only of spotting for a couple of days.  She denies any symptoms of pregnancy but would like a urine pregnancy test.  She has not missed any of her birth control pills.    The following portions of the patient's history were reviewed and updated as appropriate: allergies, current medications, past family history, past medical history, past social history, past surgical history and problem list.    Review of Systems   Gastrointestinal: Negative for abdominal distention and abdominal pain.   Genitourinary: Positive for frequency and vaginal discharge. Negative for difficulty urinating, dysuria, genital sores, hematuria, pelvic pain, urgency and vaginal bleeding.       Objective   Physical Exam   Patient is alert and conversant and in no acute distress.  The abdomen is soft, flat and nontender.  No masses were palpable.  The vulva and perineum are without lesion.  The vagina has a copious, foul-smelling discharge with large white clumps consistent with Candida.  The cervix is multiparous without lesion.  Cervix is nontender to motion.  Uterus is anteverted and normal size and shape and contour.  The adnexa are without mass or tenderness.    Assessment/Plan   Eula was seen today for follow-up.    Diagnoses and all orders for this visit:    Candida vaginitis  -     fluconazole (DIFLUCAN) 150 MG tablet; Take 1 tablet by mouth Daily.    Vaginal discharge  -     NuSwab VG+ - Swab, Vagina    Amenorrhea due to oral contraceptive    We discussed her symptoms and the findings.  I recommended we start  with fluconazole therapy.  We will await further discharge testing.  Her urine pregnancy test in the office today was negative.  She has already restarted her new pack of oral contraceptive pills.  She will call for test results.

## 2019-08-06 ENCOUNTER — TELEPHONE (OUTPATIENT)
Dept: OBSTETRICS AND GYNECOLOGY | Facility: CLINIC | Age: 34
End: 2019-08-06

## 2019-08-07 ENCOUNTER — TELEPHONE (OUTPATIENT)
Dept: OBSTETRICS AND GYNECOLOGY | Facility: CLINIC | Age: 34
End: 2019-08-07

## 2019-08-07 RX ORDER — TINIDAZOLE 500 MG/1
1 TABLET ORAL
Qty: 10 TABLET | Refills: 0 | Status: SHIPPED | OUTPATIENT
Start: 2019-08-07 | End: 2019-08-12

## 2019-08-07 NOTE — TELEPHONE ENCOUNTER
We discussed the test results which showed mild bacterial vaginosis and she would like to try Tindamax since she has taken Flagyl numerous times with evidence of recurrence.  A prescription will be sent to her pharmacy.

## 2019-08-07 NOTE — TELEPHONE ENCOUNTER
Patient calling again about lab results, she feels she needs an antibiotic.  She can be reached at [phone].

## 2020-02-26 ENCOUNTER — OFFICE VISIT (OUTPATIENT)
Dept: OBSTETRICS AND GYNECOLOGY | Facility: CLINIC | Age: 35
End: 2020-02-26

## 2020-02-26 VITALS
DIASTOLIC BLOOD PRESSURE: 79 MMHG | HEIGHT: 63 IN | BODY MASS INDEX: 17.89 KG/M2 | SYSTOLIC BLOOD PRESSURE: 142 MMHG | WEIGHT: 101 LBS

## 2020-02-26 DIAGNOSIS — N89.8 VAGINAL DISCHARGE: Primary | ICD-10-CM

## 2020-02-26 DIAGNOSIS — N92.1 BREAKTHROUGH BLEEDING ON BIRTH CONTROL PILLS: ICD-10-CM

## 2020-02-26 PROCEDURE — 99213 OFFICE O/P EST LOW 20 MIN: CPT | Performed by: OBSTETRICS & GYNECOLOGY

## 2020-02-26 NOTE — PROGRESS NOTES
Subjective   Eula Yi is a 34 y.o. female with vaginal discharge and breakthrough bleeding    History of Present Illness    34-year-old multiparous white female who is currently on oral contraceptive pills complains of a vaginal discharge with odor over the past week.  She has also had some breakthrough bleeding on her oral contraceptive pills.  She believes her thyroid medication is not controlling her thyroid as she is losing weight.  She missed a pill in January but had a normal cycle the beginning of February.  She is due to start any day.    The following portions of the patient's history were reviewed and updated as appropriate: allergies, current medications, past family history, past medical history, past social history, past surgical history and problem list.    Review of Systems   Genitourinary: Positive for vaginal bleeding and vaginal discharge. Negative for pelvic pain.       Objective   Physical Exam   The patient is alert and conversant and in no acute distress.  She weighs 101 pounds today.  The abdomen is soft, flat and nontender.  No masses are palpable.  The vulva and perineum are without lesion.  The vagina has a small amount of menstrual blood in the vault.  The cervix is without lesion and nontender to motion.  Uterus is anteverted and normal size and shape and contour.  Adnexa are without mass or tenderness.    Assessment/Plan   Eula was seen today for follow-up.    Diagnoses and all orders for this visit:    Vaginal discharge  -     NuSwab VG+ - Swab, Vagina    Breakthrough bleeding on birth control pills    We discussed her symptoms and the findings.  A vaginal swab was sent for testing.  She will follow-up with her primary care physician regarding her thyroid issues.  She will call for results.

## 2020-02-29 LAB
A VAGINAE DNA VAG QL NAA+PROBE: ABNORMAL SCORE
BVAB2 DNA VAG QL NAA+PROBE: ABNORMAL SCORE
C ALBICANS DNA VAG QL NAA+PROBE: NEGATIVE
C GLABRATA DNA VAG QL NAA+PROBE: NEGATIVE
C TRACH DNA VAG QL NAA+PROBE: NEGATIVE
MEGA1 DNA VAG QL NAA+PROBE: ABNORMAL SCORE
N GONORRHOEA DNA VAG QL NAA+PROBE: NEGATIVE
T VAGINALIS DNA VAG QL NAA+PROBE: NEGATIVE

## 2020-03-02 RX ORDER — TINIDAZOLE 500 MG/1
1 TABLET ORAL DAILY
Qty: 10 TABLET | Refills: 0 | Status: SHIPPED | OUTPATIENT
Start: 2020-03-02 | End: 2020-03-07

## 2020-05-18 ENCOUNTER — TELEPHONE (OUTPATIENT)
Dept: OBSTETRICS AND GYNECOLOGY | Facility: CLINIC | Age: 35
End: 2020-05-18

## 2020-05-18 NOTE — TELEPHONE ENCOUNTER
Good Morning,    Patient believes she has a UTI. States she is having troubles going to the bathroom. She has taken some cranberry pills and drank some juice and it seems to have helped with the symptoms but she still seems to be irritated. Would like for her to come in and leave a sample?     Please advise, thank you.   Luba

## 2020-05-19 ENCOUNTER — CLINICAL SUPPORT (OUTPATIENT)
Dept: OBSTETRICS AND GYNECOLOGY | Facility: CLINIC | Age: 35
End: 2020-05-19

## 2020-05-19 DIAGNOSIS — R39.9 UTI SYMPTOMS: Primary | ICD-10-CM

## 2020-05-19 LAB
BILIRUB BLD-MCNC: NEGATIVE MG/DL
CLARITY, POC: CLEAR
COLOR UR: YELLOW
GLUCOSE UR STRIP-MCNC: NEGATIVE MG/DL
KETONES UR QL: NEGATIVE
LEUKOCYTE EST, POC: NEGATIVE
NITRITE UR-MCNC: NEGATIVE MG/ML
PH UR: 5.5 [PH] (ref 5–8)
PROT UR STRIP-MCNC: NEGATIVE MG/DL
RBC # UR STRIP: NEGATIVE /UL
SP GR UR: 1.02 (ref 1–1.03)
UROBILINOGEN UR QL: NORMAL

## 2020-05-19 PROCEDURE — 81003 URINALYSIS AUTO W/O SCOPE: CPT | Performed by: OBSTETRICS & GYNECOLOGY

## 2020-05-22 ENCOUNTER — TELEPHONE (OUTPATIENT)
Dept: OBSTETRICS AND GYNECOLOGY | Facility: CLINIC | Age: 35
End: 2020-05-22

## 2020-05-22 NOTE — TELEPHONE ENCOUNTER
Patient would like to have her lab results read to her. She also wanted to inform you that when she urinates, after she is finished and stands up, it feels as if she is going to urinate again.  Please advise?

## 2020-05-22 NOTE — TELEPHONE ENCOUNTER
Discussed urinalysis results and stated that we were sending the urine for culture.  I recommended she start AZO and drink lots of fluids and cranberry juice.  We will call with test results.

## 2020-05-29 ENCOUNTER — TELEPHONE (OUTPATIENT)
Dept: OBSTETRICS AND GYNECOLOGY | Facility: CLINIC | Age: 35
End: 2020-05-29

## 2020-05-29 RX ORDER — NITROFURANTOIN 25; 75 MG/1; MG/1
100 CAPSULE ORAL 2 TIMES DAILY
Qty: 6 CAPSULE | Refills: 0 | Status: SHIPPED | OUTPATIENT
Start: 2020-05-29 | End: 2020-06-01

## 2020-05-29 NOTE — TELEPHONE ENCOUNTER
Good Morning,       Patient called and was checking to see if her urine cultures have come back, I told her it doesn't look like they have come back yet. Patient stated she is having severe UTI symptoms that are getting worse. She noted the it is starting to burn when she urinates and that she she doesn't feel like she is completely emptying her bladder when she goes.     Please advise, thank you.

## 2020-05-29 NOTE — TELEPHONE ENCOUNTER
Urine culture results are still pending.  I went ahead and electronically sent a prescription for Macrobid to her pharmacy.  We will call when culture results are back.

## 2020-06-11 ENCOUNTER — TELEPHONE (OUTPATIENT)
Dept: OBSTETRICS AND GYNECOLOGY | Facility: CLINIC | Age: 35
End: 2020-06-11

## 2020-06-11 RX ORDER — SULFAMETHOXAZOLE AND TRIMETHOPRIM 800; 160 MG/1; MG/1
1 TABLET ORAL 2 TIMES DAILY
Qty: 6 TABLET | Refills: 0 | Status: SHIPPED | OUTPATIENT
Start: 2020-06-11 | End: 2020-06-14

## 2020-06-11 NOTE — TELEPHONE ENCOUNTER
We have not received any results from the urine specimen she left for evaluation.  I recommended we try Bactrim DS for a 3-day course and if her symptoms persist, she should return for a follow-up visit.

## 2020-06-11 NOTE — TELEPHONE ENCOUNTER
Patient states that she never received the results of her urine culture from 5/19 and she said that the macrobid she was prescribed didn't help with her symptoms so she would like a call back see what results were and what she needs to do.

## 2020-08-06 ENCOUNTER — OFFICE VISIT (OUTPATIENT)
Dept: OBSTETRICS AND GYNECOLOGY | Facility: CLINIC | Age: 35
End: 2020-08-06

## 2020-08-06 VITALS
DIASTOLIC BLOOD PRESSURE: 74 MMHG | WEIGHT: 101 LBS | BODY MASS INDEX: 17.89 KG/M2 | HEIGHT: 63 IN | SYSTOLIC BLOOD PRESSURE: 114 MMHG

## 2020-08-06 DIAGNOSIS — R39.9 UTI SYMPTOMS: Primary | ICD-10-CM

## 2020-08-06 PROCEDURE — 99213 OFFICE O/P EST LOW 20 MIN: CPT | Performed by: OBSTETRICS & GYNECOLOGY

## 2020-08-06 NOTE — PROGRESS NOTES
Subjective   Eula Yi is a 35 y.o. female with UTI symptoms    History of Present Illness   35-year-old multiparous white female presents with recurrent UTI symptoms.  She was treated for UTI symptoms and early June with Macrobid followed by Bactrim DS and her symptoms improved.  She has noted pelvic pressure with difficulty emptying her bladder over the past 3 weeks.  She denies any hematuria or dysuria.  She has noted frequency as well as hesitancy.  Her menstrual cycles have been regular and well controlled with oral contraceptives.  She denies any vaginal discharges or vaginal itching.     The following portions of the patient's history were reviewed and updated as appropriate: allergies, current medications, past family history, past medical history, past social history, past surgical history and problem list.    Review of Systems   Genitourinary: Positive for decreased urine volume, difficulty urinating, frequency and urgency. Negative for dyspareunia, dysuria, flank pain, hematuria, menstrual problem, vaginal bleeding and vaginal discharge.       Objective   Physical Exam the patient is alert and conversant and in no acute distress.  The abdomen is soft, flat and nontender.  There is no CVA tenderness.  No masses were palpable.  No vulva and perineum are without lesion.  The vagina is without bleeding or discharge.  There is good anterior bladder support.  The cervix is without lesion and nontender to motion.  Uterus is anteverted and normal size and shape and contour.  The adnexa are without mass or tenderness.  There is mild tenderness to palpation of the bladder anterior to the uterine fundus.    Assessment/Plan   Eula was seen today for follow-up.    Diagnoses and all orders for this visit:    UTI symptoms  -     Urine Culture - Urine, Urine, Clean Catch    We discussed her symptoms and the findings.  I recommended she increase her fluid intake and we will check a urine culture.  If symptoms  persist without evidence of a UTI, we may need to refer to urology.

## 2020-08-16 LAB
BACTERIA UR CULT: NO GROWTH
BACTERIA UR CULT: NORMAL

## 2020-08-17 ENCOUNTER — TELEPHONE (OUTPATIENT)
Dept: OBSTETRICS AND GYNECOLOGY | Facility: CLINIC | Age: 35
End: 2020-08-17

## 2020-08-17 DIAGNOSIS — N30.90 RECURRENT CYSTITIS WITH NEGATIVE CULTURE: Primary | ICD-10-CM

## 2020-08-17 NOTE — TELEPHONE ENCOUNTER
Patient is aware of lab results. She is still having issues and would like to be referred to urology.

## 2020-08-17 NOTE — TELEPHONE ENCOUNTER
We discussed her urine culture results and I recommended [facility] see [facility] for further evaluation and work-up.

## 2020-08-18 NOTE — TELEPHONE ENCOUNTER
Pt aware she is scheduled at [facility], [place] office, w/Dr. Young on Fri, 8/28/2020 at 9:30, 9:15 arrival.

## 2021-05-17 ENCOUNTER — OFFICE VISIT (OUTPATIENT)
Dept: OBSTETRICS AND GYNECOLOGY | Facility: CLINIC | Age: 36
End: 2021-05-17

## 2021-05-17 VITALS
SYSTOLIC BLOOD PRESSURE: 116 MMHG | WEIGHT: 102 LBS | HEIGHT: 63 IN | BODY MASS INDEX: 18.07 KG/M2 | DIASTOLIC BLOOD PRESSURE: 80 MMHG

## 2021-05-17 DIAGNOSIS — R35.0 URINARY FREQUENCY: ICD-10-CM

## 2021-05-17 DIAGNOSIS — Z11.3 SCREENING FOR STD (SEXUALLY TRANSMITTED DISEASE): Primary | ICD-10-CM

## 2021-05-17 DIAGNOSIS — N89.8 VAGINAL DISCHARGE: ICD-10-CM

## 2021-05-17 LAB
BILIRUB BLD-MCNC: NEGATIVE MG/DL
CLARITY, POC: CLEAR
COLOR UR: YELLOW
GLUCOSE UR STRIP-MCNC: NEGATIVE MG/DL
KETONES UR QL: NEGATIVE
LEUKOCYTE EST, POC: NEGATIVE
NITRITE UR-MCNC: NEGATIVE MG/ML
PH UR: 7.5 [PH] (ref 5–8)
PROT UR STRIP-MCNC: NEGATIVE MG/DL
RBC # UR STRIP: NEGATIVE /UL
SP GR UR: 1.02 (ref 1–1.03)
UROBILINOGEN UR QL: NORMAL

## 2021-05-17 PROCEDURE — 99213 OFFICE O/P EST LOW 20 MIN: CPT | Performed by: NURSE PRACTITIONER

## 2021-05-17 RX ORDER — LEVOTHYROXINE SODIUM 0.05 MG/1
TABLET ORAL
COMMUNITY

## 2021-05-17 RX ORDER — DIPHENOXYLATE HYDROCHLORIDE AND ATROPINE SULFATE 2.5; .025 MG/1; MG/1
TABLET ORAL
COMMUNITY

## 2021-05-17 RX ORDER — NORGESTIMATE AND ETHINYL ESTRADIOL
KIT
COMMUNITY
Start: 2021-04-21 | End: 2022-03-29

## 2021-05-17 NOTE — PROGRESS NOTES
"Chief Complaint   Patient presents with   • Vaginal Discharge     Pt c/o brown, stringy discharge.         SUBJECTIVE:     Eula Yi is a 36 y.o.  who presents with c/o brown vaginal discharge off and on for 3 weeks. She is taking OCP prescribed by endocrinology. Reports a hx of frequent BV infections.  lives out of town, she was sexually active with him approx 2 months ago, unprotected. She is concerned for STD.  This is a new problem. LMP 21. C/o LLQ pain X 3 weeks. Describes cramping and sharp, pain is intermittent, she treats pain with tylenol as needed. She has recently had urinary frequency, denies dysuria.     Past Medical History:   Diagnosis Date   • Amenorrhea    • Disease of thyroid gland    • Kidney stones       Past Surgical History:   Procedure Laterality Date   • KIDNEY STONE SURGERY     • NEPHRECTOMY     • SINUS SURGERY     • WISDOM TOOTH EXTRACTION        Social History     Tobacco Use   • Smoking status: Current Every Day Smoker     Packs/day: 1.00   Substance Use Topics   • Alcohol use: Yes     Comment: Occ.   • Drug use: No     OB History    Para Term  AB Living   3 2 2     2   SAB TAB Ectopic Molar Multiple Live Births                    # Outcome Date GA Lbr Claudio/2nd Weight Sex Delivery Anes PTL Lv   3             2 Term            1 Term                 Review of Systems   Constitutional: Negative for chills, fatigue and fever.   Gastrointestinal: Negative for abdominal distention, abdominal pain, nausea and vomiting.   Genitourinary: Positive for frequency, pelvic pain and vaginal discharge. Negative for dysuria, menstrual problem, urgency, vaginal bleeding and vaginal pain.   Musculoskeletal: Negative for gait problem.   Skin: Negative for rash.   Neurological: Negative for dizziness and headaches.       OBJECTIVE:   Vitals:    21 1606   BP: 116/80   Weight: 46.3 kg (102 lb)   Height: 160 cm (63\")        Physical Exam  Vitals and nursing " note reviewed.   Constitutional:       Appearance: Normal appearance.   HENT:      Head: Normocephalic and atraumatic.   Eyes:      Pupils: Pupils are equal, round, and reactive to light.   Cardiovascular:      Rate and Rhythm: Normal rate.   Pulmonary:      Effort: Pulmonary effort is normal.   Abdominal:      General: Abdomen is flat. There is no distension.      Palpations: Abdomen is soft. There is no mass.      Tenderness: There is no abdominal tenderness. There is no right CVA tenderness, left CVA tenderness or guarding.      Hernia: No hernia is present. There is no hernia in the left inguinal area or right inguinal area.   Genitourinary:     General: Normal vulva.      Exam position: Lithotomy position.      Pubic Area: No rash or pubic lice.       Labia:         Right: No rash, tenderness, lesion or injury.         Left: No rash, tenderness, lesion or injury.       Urethra: No prolapse, urethral pain, urethral swelling or urethral lesion.      Vagina: No signs of injury and foreign body. Vaginal discharge (brown discharge and yellow tinged thin, watery) present. No erythema, tenderness, bleeding, lesions or prolapsed vaginal walls.      Cervix: No cervical motion tenderness, discharge, friability, lesion, erythema, cervical bleeding or eversion.      Uterus: Not deviated, not enlarged, not fixed, not tender and no uterine prolapse.       Adnexa:         Right: No mass, tenderness or fullness.          Left: No mass, tenderness or fullness.     Musculoskeletal:         General: Normal range of motion.      Cervical back: Normal range of motion.   Lymphadenopathy:      Lower Body: No right inguinal adenopathy. No left inguinal adenopathy.   Skin:     General: Skin is warm and dry.   Neurological:      General: No focal deficit present.      Mental Status: She is alert and oriented to person, place, and time.      Cranial Nerves: No cranial nerve deficit.   Psychiatric:         Mood and Affect: Mood normal.          Behavior: Behavior normal.         Thought Content: Thought content normal.         Judgment: Judgment normal.         ASSESSMENT:   1) Vaginal discharge  2) Screening for STD  3) Urinary frequency    PLAN:   NuSwab + collected  HIV, RPR, and hepatitis panel   Urine dip negative for UTI  Encouraged condoms with intercourse    Follow up:PRN and annually        Tina Ibrahim, APRN  5/17/2021  16:25 EDT

## 2021-05-19 LAB
HAV IGM SERPL QL IA: NEGATIVE
HBV CORE IGM SERPL QL IA: NEGATIVE
HBV SURFACE AG SERPL QL IA: NEGATIVE
HCV AB S/CO SERPL IA: <0.1 S/CO RATIO (ref 0–0.9)
HIV 1+2 AB+HIV1 P24 AG SERPL QL IA: NON REACTIVE
RPR SER QL: NORMAL

## 2021-05-21 LAB
A VAGINAE DNA VAG QL NAA+PROBE: NORMAL SCORE
BVAB2 DNA VAG QL NAA+PROBE: NORMAL SCORE
C ALBICANS DNA VAG QL NAA+PROBE: NEGATIVE
C GLABRATA DNA VAG QL NAA+PROBE: NEGATIVE
C TRACH DNA VAG QL NAA+PROBE: NEGATIVE
MEGA1 DNA VAG QL NAA+PROBE: NORMAL SCORE
N GONORRHOEA DNA VAG QL NAA+PROBE: NEGATIVE
T VAGINALIS DNA VAG QL NAA+PROBE: NEGATIVE

## 2021-06-22 ENCOUNTER — OFFICE VISIT (OUTPATIENT)
Dept: OBSTETRICS AND GYNECOLOGY | Facility: CLINIC | Age: 36
End: 2021-06-22

## 2021-06-22 VITALS
BODY MASS INDEX: 18.25 KG/M2 | WEIGHT: 103 LBS | HEIGHT: 63 IN | DIASTOLIC BLOOD PRESSURE: 58 MMHG | SYSTOLIC BLOOD PRESSURE: 110 MMHG

## 2021-06-22 DIAGNOSIS — N89.8 VAGINAL DISCHARGE: Primary | ICD-10-CM

## 2021-06-22 DIAGNOSIS — L29.2 VULVAR ITCHING: ICD-10-CM

## 2021-06-22 DIAGNOSIS — Z11.3 SCREENING FOR STD (SEXUALLY TRANSMITTED DISEASE): ICD-10-CM

## 2021-06-22 PROCEDURE — 99213 OFFICE O/P EST LOW 20 MIN: CPT | Performed by: NURSE PRACTITIONER

## 2021-06-22 NOTE — PROGRESS NOTES
"Chief Complaint   Patient presents with   • Vaginal Discharge     Pt c/o vaginal discharge, itching and irritation        SUBJECTIVE:     Eula Yi is a 36 y.o.  who presents with a 1-2 week history of vaginal discharge and vulvar itching/irritation. Denies vaginal odor. This is a new problem. LMP 6/15/21.  Reports recent new partner, she is concerned for STD as recently condom broke during intercourse. She would like screening today.     Past Medical History:   Diagnosis Date   • Amenorrhea    • Disease of thyroid gland    • Kidney stones       Past Surgical History:   Procedure Laterality Date   • KIDNEY STONE SURGERY     • NEPHRECTOMY     • SINUS SURGERY     • WISDOM TOOTH EXTRACTION        Social History     Tobacco Use   • Smoking status: Current Every Day Smoker     Packs/day: 1.00   Substance Use Topics   • Alcohol use: Yes     Comment: Occ.   • Drug use: No     OB History    Para Term  AB Living   3 2 2     2   SAB TAB Ectopic Molar Multiple Live Births                    # Outcome Date GA Lbr Claudio/2nd Weight Sex Delivery Anes PTL Lv   3             2 Term            1 Term                 Review of Systems   Constitutional: Negative for chills, fatigue and fever.   Gastrointestinal: Negative for abdominal distention, abdominal pain, nausea and vomiting.   Genitourinary: Positive for vaginal discharge. Negative for dyspareunia, dysuria, menstrual problem, pelvic pain, vaginal bleeding and vaginal pain.        + vulvar itching  - vaginal odor   Musculoskeletal: Negative for gait problem.   Skin: Negative for rash.   Neurological: Negative for dizziness and headaches.   Hematological: Does not bruise/bleed easily.   Psychiatric/Behavioral: Negative for behavioral problems.       OBJECTIVE:   Vitals:    21 1511   BP: 110/58   Weight: 46.7 kg (103 lb)   Height: 160 cm (63\")        Physical Exam  Vitals and nursing note reviewed.   Constitutional:       Appearance: Normal " appearance.   HENT:      Head: Normocephalic and atraumatic.   Eyes:      Pupils: Pupils are equal, round, and reactive to light.   Cardiovascular:      Rate and Rhythm: Normal rate.   Pulmonary:      Effort: Pulmonary effort is normal.   Abdominal:      General: Abdomen is flat. There is no distension.      Palpations: Abdomen is soft. There is no mass.      Tenderness: There is no abdominal tenderness. There is no guarding.      Hernia: No hernia is present. There is no hernia in the left inguinal area or right inguinal area.   Genitourinary:     General: Normal vulva.      Exam position: Lithotomy position.      Pubic Area: No rash or pubic lice.       Labia:         Right: No rash, tenderness, lesion or injury.         Left: No rash, tenderness, lesion or injury.       Urethra: No prolapse, urethral pain, urethral swelling or urethral lesion.      Vagina: No signs of injury and foreign body. Bleeding (consistent with end of menses) present. No vaginal discharge, erythema, tenderness, lesions or prolapsed vaginal walls.      Cervix: No cervical motion tenderness, discharge, friability, lesion, erythema, cervical bleeding or eversion.      Uterus: Not deviated, not enlarged, not fixed, not tender and no uterine prolapse.       Adnexa:         Right: No mass, tenderness or fullness.          Left: No mass, tenderness or fullness.     Musculoskeletal:         General: Normal range of motion.      Cervical back: Normal range of motion.   Lymphadenopathy:      Lower Body: No right inguinal adenopathy. No left inguinal adenopathy.   Skin:     General: Skin is warm and dry.   Neurological:      General: No focal deficit present.      Mental Status: She is alert and oriented to person, place, and time.      Cranial Nerves: No cranial nerve deficit.   Psychiatric:         Mood and Affect: Mood normal.         Behavior: Behavior normal.         Thought Content: Thought content normal.         Judgment: Judgment normal.          ASSESSMENT:   1) Vaginal discharge  2) Vulvar irritation  3) Screening for STD    PLAN:   NuSwab+ collected  No abnormal discharge noted on exam, no vulvar lesions or erythema  Will treat c/o itching and irritation with terazol 7  HIV, RPR, and hepatitis collected today  Encouraged to schedule annual gyn exam    Follow up:PRN and annually       Tina Ibrahim, APRN  6/22/2021  15:16 EDT

## 2021-06-23 ENCOUNTER — TELEPHONE (OUTPATIENT)
Dept: OBSTETRICS AND GYNECOLOGY | Facility: CLINIC | Age: 36
End: 2021-06-23

## 2022-03-29 ENCOUNTER — OFFICE VISIT (OUTPATIENT)
Dept: OBSTETRICS AND GYNECOLOGY | Facility: CLINIC | Age: 37
End: 2022-03-29

## 2022-03-29 VITALS
SYSTOLIC BLOOD PRESSURE: 129 MMHG | WEIGHT: 98 LBS | BODY MASS INDEX: 17.36 KG/M2 | DIASTOLIC BLOOD PRESSURE: 80 MMHG | HEIGHT: 63 IN

## 2022-03-29 DIAGNOSIS — R39.9 UTI SYMPTOMS: ICD-10-CM

## 2022-03-29 DIAGNOSIS — Z11.3 SCREENING FOR STD (SEXUALLY TRANSMITTED DISEASE): ICD-10-CM

## 2022-03-29 DIAGNOSIS — Z30.011 ENCOUNTER FOR INITIAL PRESCRIPTION OF CONTRACEPTIVE PILLS: ICD-10-CM

## 2022-03-29 DIAGNOSIS — Z01.419 ENCOUNTER FOR ANNUAL ROUTINE GYNECOLOGICAL EXAMINATION: Primary | ICD-10-CM

## 2022-03-29 DIAGNOSIS — N91.2 AMENORRHEA: ICD-10-CM

## 2022-03-29 LAB
BILIRUB BLD-MCNC: NEGATIVE MG/DL
CLARITY, POC: CLEAR
COLOR UR: YELLOW
GLUCOSE UR STRIP-MCNC: NEGATIVE MG/DL
KETONES UR QL: NEGATIVE
LEUKOCYTE EST, POC: NEGATIVE
NITRITE UR-MCNC: NEGATIVE MG/ML
PH UR: 5.5 [PH] (ref 5–8)
PROT UR STRIP-MCNC: NEGATIVE MG/DL
RBC # UR STRIP: ABNORMAL /UL
SP GR UR: 1.03 (ref 1–1.03)
UROBILINOGEN UR QL: NORMAL

## 2022-03-29 PROCEDURE — 99213 OFFICE O/P EST LOW 20 MIN: CPT | Performed by: NURSE PRACTITIONER

## 2022-03-29 PROCEDURE — 2014F MENTAL STATUS ASSESS: CPT | Performed by: NURSE PRACTITIONER

## 2022-03-29 PROCEDURE — 99395 PREV VISIT EST AGE 18-39: CPT | Performed by: NURSE PRACTITIONER

## 2022-03-29 PROCEDURE — 3008F BODY MASS INDEX DOCD: CPT | Performed by: NURSE PRACTITIONER

## 2022-03-29 RX ORDER — ACETAMINOPHEN AND CODEINE PHOSPHATE 120; 12 MG/5ML; MG/5ML
1 SOLUTION ORAL DAILY
Qty: 84 TABLET | Refills: 3 | Status: SHIPPED | OUTPATIENT
Start: 2022-03-29 | End: 2023-03-29

## 2022-03-29 RX ORDER — SULFAMETHOXAZOLE AND TRIMETHOPRIM 800; 160 MG/1; MG/1
1 TABLET ORAL 2 TIMES DAILY
Qty: 6 TABLET | Refills: 0 | Status: SHIPPED | OUTPATIENT
Start: 2022-03-29

## 2022-03-29 NOTE — PROGRESS NOTES
GYN Annual Exam     Chief Complaint   Patient presents with   • Gynecologic Exam     Annual exam - last pap  negative, HPV positive. Patient also c/o bladder spasms and pain with urination.      HPI    Eula iY is a 37 y.o. female who presents for annual well woman exam with a problem.  She is sexually active. Periods are rare, lasting 4 days. Dysmenorrhea:none. Cyclic symptoms include none. No intermenstrual bleeding, spotting, or discharge. Performing SBE:occas    C/o amenorrhea since stopping SHEILA in October. This was prescribed by endocrinology. She stopped taking due to insurance issues. She would like to restart this today. Denies history of migraine with aura, denies history of DVT, there is no family history of DVT. She is an every day smoker however. Hx hypothyroidism, she has not seen endocrine in over one year. Stopped levothyroxine in October.     C/o painful urination for 1 week. Denies fevers, chills, or N&V. She is having low back pain. C/o urinary frequency and urgency. Hx kidney stones. Last seen by Urology 1 year ago. Reports today symptoms are not consistent with her typical kidney stone pain    She would like STD testing today.     OB History        3    Para   2    Term   2            AB        Living   2       SAB        IAB        Ectopic        Molar        Multiple        Live Births                    LMP- 2021  Current contraception: condoms  Last Pap-  negative, HPV positive   History of abnormal Pap smear: yes - +HPV , ASCUS   History of STD-trichomonas, gonorrhea, HPV  Family history of uterine, colon or ovarian cancer: no  Family history of breast cancer: yes - MGM      Past Medical History:   Diagnosis Date   • Amenorrhea    • Disease of thyroid gland    • Kidney stones        Past Surgical History:   Procedure Laterality Date   • KIDNEY STONE SURGERY     • NEPHRECTOMY     • SINUS SURGERY     • WISDOM TOOTH EXTRACTION           Current  "Outpatient Medications:   •  levothyroxine (SYNTHROID, LEVOTHROID) 50 MCG tablet, levothyroxine 50 mcg tablet, Disp: , Rfl:   •  multivitamin (THERAGRAN) tablet tablet, multivitamin tablet  Take 1 tablet every day by oral route., Disp: , Rfl:   •  norethindrone (MICRONOR) 0.35 MG tablet, Take 1 tablet by mouth Daily., Disp: 84 tablet, Rfl: 3  •  sulfamethoxazole-trimethoprim (Bactrim DS) 800-160 MG per tablet, Take 1 tablet by mouth 2 (Two) Times a Day., Disp: 6 tablet, Rfl: 0    Allergies   Allergen Reactions   • Penicillins Nausea Only, Itching and GI Intolerance   • Shellfish-Derived Products Anaphylaxis       Social History     Tobacco Use   • Smoking status: Current Every Day Smoker     Packs/day: 1.00   Substance Use Topics   • Alcohol use: Yes     Comment: Occ.   • Drug use: No       Family History   Problem Relation Age of Onset   • Depression Mother    • Kidney disease Mother    • Breast cancer Maternal Grandmother 75       Review of Systems   Constitutional: Negative for chills, fatigue and fever.   Gastrointestinal: Negative for abdominal distention, abdominal pain, nausea and vomiting.   Genitourinary: Positive for amenorrhea, dysuria, frequency, menstrual problem and urgency. Negative for breast discharge, breast lump, breast pain, pelvic pain, pelvic pressure, vaginal bleeding, vaginal discharge and vaginal pain.   Musculoskeletal: Negative for gait problem.   Skin: Negative for rash.   Neurological: Negative for dizziness and headache.   Psychiatric/Behavioral: Negative for behavioral problems.       /80   Ht 160 cm (63\")   Wt 44.5 kg (98 lb)   LMP  (Approximate) Comment: LMP in October 2021  BMI 17.36 kg/m²     Physical Exam  Constitutional:       Appearance: Normal appearance.   Genitourinary:      Vulva, bladder and urethral meatus normal.      No lesions in the vagina.      Right Labia: No rash, tenderness, lesions, skin changes or Bartholin's cyst.     Left Labia: No tenderness, " lesions, skin changes, Bartholin's cyst or rash.     No labial fusion noted.      No inguinal adenopathy present in the right or left side.     Vaginal discharge (thick, white, small amount) present.      No vaginal erythema, tenderness, bleeding or ulceration.      No vaginal prolapse present.     No vaginal atrophy present.       Right Adnexa: not tender, not full, not palpable, no mass present and not absent.     Left Adnexa: not tender, not full, not palpable, no mass present and not absent.     No cervical motion tenderness, discharge, friability, lesion, polyp, nabothian cyst or eversion.      Uterus is not enlarged, fixed, tender, irregular or prolapsed.      No uterine mass detected.     No urethral tenderness or mass present.      Pelvic exam was performed with patient in the lithotomy position.   Breasts: Breasts are symmetrical.      Right: Present. No swelling, bleeding, inverted nipple, mass, nipple discharge, skin change, tenderness, breast implant, axillary adenopathy or supraclavicular adenopathy.      Left: Present. No swelling, bleeding, inverted nipple, mass, nipple discharge, skin change, tenderness, breast implant, axillary adenopathy or supraclavicular adenopathy.       HENT:      Head: Normocephalic and atraumatic.   Eyes:      Pupils: Pupils are equal, round, and reactive to light.   Cardiovascular:      Rate and Rhythm: Normal rate.   Pulmonary:      Effort: Pulmonary effort is normal.   Abdominal:      General: There is no distension.      Palpations: Abdomen is soft. There is no mass.      Tenderness: There is no abdominal tenderness. There is no right CVA tenderness, left CVA tenderness, guarding or rebound.      Hernia: No hernia is present. There is no hernia in the left inguinal area or right inguinal area.   Musculoskeletal:         General: Normal range of motion.      Cervical back: Normal range of motion and neck supple. No tenderness.   Lymphadenopathy:      Cervical: No cervical  adenopathy.      Upper Body:      Right upper body: No supraclavicular, axillary or pectoral adenopathy.      Left upper body: No supraclavicular, axillary or pectoral adenopathy.      Lower Body: No right inguinal adenopathy. No left inguinal adenopathy.   Neurological:      General: No focal deficit present.      Mental Status: She is alert and oriented to person, place, and time.      Cranial Nerves: No cranial nerve deficit.   Skin:     General: Skin is warm and dry.   Psychiatric:         Mood and Affect: Mood normal.         Behavior: Behavior normal.         Thought Content: Thought content normal.         Judgment: Judgment normal.   Vitals and nursing note reviewed.       Brief Urine Lab Results  (Last result in the past 365 days)      Color   Clarity   Blood   Leuk Est   Nitrite   Protein   CREAT   Urine HCG        03/29/22 1404 Yellow   Clear   Small   Negative   Negative   Negative               =  Assessment   Diagnoses and all orders for this visit:    1. Encounter for annual routine gynecological examination (Primary)  -     IGP, Apt HPV,rfx 16 / 18,45    2. Encounter for initial prescription of contraceptive pills  -     norethindrone (MICRONOR) 0.35 MG tablet; Take 1 tablet by mouth Daily.  Dispense: 84 tablet; Refill: 3    3. Screening for STD (sexually transmitted disease)  -     Chlamydia trachomatis, Neisseria gonorrhoeae, Trichomonas vaginalis, PCR - Swab, Cervix  -     Hepatitis Panel, Acute  -     HIV-1 / O / 2 Ag / Antibody 4th Generation  -     RPR    4. UTI symptoms  -     POC Urinalysis Dipstick  -     Urine Culture - Urine, Urine, Clean Catch  -     sulfamethoxazole-trimethoprim (Bactrim DS) 800-160 MG per tablet; Take 1 tablet by mouth 2 (Two) Times a Day.  Dispense: 6 tablet; Refill: 0    5. Amenorrhea  -     CBC & Differential  -     Hemoglobin A1c  -     TSH       Plan   1. Well woman exam: Pap collected Yes. Recommend MVI daily.    2. Contraception: Discussed contraindications for  estrogen use with tobacco use. Recommend POP. She is agreeable to this. Discussed start up, discussed timing of dosing. Recommend condoms for first 3 weeks to prevent pregnancy  3. STD: Enc condoms. Desires STD screen today- Yes. Serum and NuSwab  4. Smoking status: current every day smoker  5.  Encouraged annual mammogram screening starting at age 40. Instructed on how to perform SBE. Encouraged breast health self awareness.  6.    Encouraged 150 minutes of exercise per week if not medially contraindicated.   7.    Patient's Body mass index is 17.36 kg/m². indicating that she is underweight.   8.    Discussed possible causes of amenorrhea, currently concerned with her weight and hx hypothyroidism that is not being treated will check TSH today. Recommend f/u with endocrinology to further manage.   9.    Will treat painful urination and urinary frequency empirically for UTI. Bactrim sent to pharmacy. No CVA tenderness. Enc to go to ED with any worsening symptoms, fevers, chills, body aches, or N&V. Recommend f/u with urology, particularly if symptoms are not improved in 5-7 days       Follow Up one year or PRN    Tina Ibrahim, APRN  3/29/2022  15:25 EDT

## 2022-03-30 LAB
BASOPHILS # BLD AUTO: 0.2 X10E3/UL (ref 0–0.2)
BASOPHILS NFR BLD AUTO: 2 %
EOSINOPHIL # BLD AUTO: 0.6 X10E3/UL (ref 0–0.4)
EOSINOPHIL NFR BLD AUTO: 6 %
ERYTHROCYTE [DISTWIDTH] IN BLOOD BY AUTOMATED COUNT: 12.1 % (ref 11.7–15.4)
HAV IGM SERPL QL IA: NEGATIVE
HBA1C MFR BLD: 5.6 % (ref 4.8–5.6)
HBV CORE IGM SERPL QL IA: NEGATIVE
HBV SURFACE AG SERPL QL IA: NEGATIVE
HCT VFR BLD AUTO: 40.5 % (ref 34–46.6)
HCV AB S/CO SERPL IA: <0.1 S/CO RATIO (ref 0–0.9)
HGB BLD-MCNC: 13.2 G/DL (ref 11.1–15.9)
HIV 1+2 AB+HIV1 P24 AG SERPL QL IA: NON REACTIVE
IMM GRANULOCYTES # BLD AUTO: 0 X10E3/UL (ref 0–0.1)
IMM GRANULOCYTES NFR BLD AUTO: 0 %
LYMPHOCYTES # BLD AUTO: 3.5 X10E3/UL (ref 0.7–3.1)
LYMPHOCYTES NFR BLD AUTO: 37 %
MCH RBC QN AUTO: 32 PG (ref 26.6–33)
MCHC RBC AUTO-ENTMCNC: 32.6 G/DL (ref 31.5–35.7)
MCV RBC AUTO: 98 FL (ref 79–97)
MONOCYTES # BLD AUTO: 0.5 X10E3/UL (ref 0.1–0.9)
MONOCYTES NFR BLD AUTO: 6 %
NEUTROPHILS # BLD AUTO: 4.8 X10E3/UL (ref 1.4–7)
NEUTROPHILS NFR BLD AUTO: 49 %
PLATELET # BLD AUTO: 399 X10E3/UL (ref 150–450)
RBC # BLD AUTO: 4.12 X10E6/UL (ref 3.77–5.28)
RPR SER QL: NON REACTIVE
TSH SERPL DL<=0.005 MIU/L-ACNC: 1.24 UIU/ML (ref 0.45–4.5)
WBC # BLD AUTO: 9.6 X10E3/UL (ref 3.4–10.8)

## 2022-03-31 LAB
BACTERIA UR CULT: NO GROWTH
BACTERIA UR CULT: NORMAL
C TRACH RRNA SPEC QL NAA+PROBE: NEGATIVE
N GONORRHOEA RRNA SPEC QL NAA+PROBE: NEGATIVE
T VAGINALIS RRNA SPEC QL NAA+PROBE: NEGATIVE

## 2022-04-03 LAB
CYTOLOGIST CVX/VAG CYTO: NORMAL
CYTOLOGY CVX/VAG DOC CYTO: NORMAL
CYTOLOGY CVX/VAG DOC THIN PREP: NORMAL
DX ICD CODE: NORMAL
HIV 1 & 2 AB SER-IMP: NORMAL
HPV I/H RISK 4 DNA CVX QL PROBE+SIG AMP: NEGATIVE
OTHER STN SPEC: NORMAL
STAT OF ADQ CVX/VAG CYTO-IMP: NORMAL

## 2023-10-28 ENCOUNTER — APPOINTMENT (OUTPATIENT)
Dept: CT IMAGING | Facility: HOSPITAL | Age: 38
End: 2023-10-28
Payer: COMMERCIAL

## 2023-10-28 ENCOUNTER — HOSPITAL ENCOUNTER (EMERGENCY)
Facility: HOSPITAL | Age: 38
Discharge: HOME | End: 2023-10-28
Attending: STUDENT IN AN ORGANIZED HEALTH CARE EDUCATION/TRAINING PROGRAM
Payer: COMMERCIAL

## 2023-10-28 VITALS
SYSTOLIC BLOOD PRESSURE: 113 MMHG | HEART RATE: 69 BPM | OXYGEN SATURATION: 100 % | TEMPERATURE: 97.6 F | DIASTOLIC BLOOD PRESSURE: 91 MMHG | RESPIRATION RATE: 16 BRPM

## 2023-10-28 DIAGNOSIS — R10.31 RIGHT LOWER QUADRANT ABDOMINAL PAIN: Primary | ICD-10-CM

## 2023-10-28 LAB
ALBUMIN SERPL-MCNC: 4.4 G/DL (ref 3.5–5.2)
ALBUMIN/GLOB SERPL: 2.1 G/DL
ALP SERPL-CCNC: 56 U/L (ref 39–117)
ALT SERPL W P-5'-P-CCNC: 8 U/L (ref 1–33)
ANION GAP SERPL CALCULATED.3IONS-SCNC: 8 MMOL/L (ref 5–15)
AST SERPL-CCNC: 15 U/L (ref 1–32)
BASOPHILS # BLD AUTO: 0.1 10*3/MM3 (ref 0–0.2)
BASOPHILS NFR BLD AUTO: 1.1 % (ref 0–1.5)
BILIRUB SERPL-MCNC: 0.4 MG/DL (ref 0–1.2)
BILIRUB UR QL STRIP: NEGATIVE
BUN SERPL-MCNC: 7 MG/DL (ref 6–20)
BUN/CREAT SERPL: 9.2 (ref 7–25)
CALCIUM SPEC-SCNC: 10 MG/DL (ref 8.6–10.5)
CHLORIDE SERPL-SCNC: 102 MMOL/L (ref 98–107)
CLARITY UR: CLEAR
CO2 SERPL-SCNC: 27 MMOL/L (ref 22–29)
COLOR UR: YELLOW
CREAT SERPL-MCNC: 0.76 MG/DL (ref 0.57–1)
DEPRECATED RDW RBC AUTO: 47.7 FL (ref 37–54)
EGFRCR SERPLBLD CKD-EPI 2021: 103 ML/MIN/1.73
EOSINOPHIL # BLD AUTO: 0.58 10*3/MM3 (ref 0–0.4)
EOSINOPHIL NFR BLD AUTO: 6.3 % (ref 0.3–6.2)
ERYTHROCYTE [DISTWIDTH] IN BLOOD BY AUTOMATED COUNT: 12.7 % (ref 12.3–15.4)
GLOBULIN UR ELPH-MCNC: 2.1 GM/DL
GLUCOSE SERPL-MCNC: 101 MG/DL (ref 65–99)
GLUCOSE UR STRIP-MCNC: NEGATIVE MG/DL
HCG SERPL QL: NEGATIVE
HCT VFR BLD AUTO: 40.4 % (ref 34–46.6)
HGB BLD-MCNC: 13.5 G/DL (ref 12–15.9)
HGB UR QL STRIP.AUTO: NEGATIVE
IMM GRANULOCYTES # BLD AUTO: 0.02 10*3/MM3 (ref 0–0.05)
IMM GRANULOCYTES NFR BLD AUTO: 0.2 % (ref 0–0.5)
KETONES UR QL STRIP: NEGATIVE
LEUKOCYTE ESTERASE UR QL STRIP.AUTO: NEGATIVE
LIPASE SERPL-CCNC: 19 U/L (ref 13–60)
LYMPHOCYTES # BLD AUTO: 3.99 10*3/MM3 (ref 0.7–3.1)
LYMPHOCYTES NFR BLD AUTO: 43 % (ref 19.6–45.3)
MCH RBC QN AUTO: 33.7 PG (ref 26.6–33)
MCHC RBC AUTO-ENTMCNC: 33.4 G/DL (ref 31.5–35.7)
MCV RBC AUTO: 100.7 FL (ref 79–97)
MONOCYTES # BLD AUTO: 0.67 10*3/MM3 (ref 0.1–0.9)
MONOCYTES NFR BLD AUTO: 7.2 % (ref 5–12)
NEUTROPHILS NFR BLD AUTO: 3.91 10*3/MM3 (ref 1.7–7)
NEUTROPHILS NFR BLD AUTO: 42.2 % (ref 42.7–76)
NITRITE UR QL STRIP: NEGATIVE
NRBC BLD AUTO-RTO: 0 /100 WBC (ref 0–0.2)
PH UR STRIP.AUTO: 5.5 [PH] (ref 5–8)
PLATELET # BLD AUTO: 301 10*3/MM3 (ref 140–450)
PMV BLD AUTO: 10 FL (ref 6–12)
POTASSIUM SERPL-SCNC: 4.2 MMOL/L (ref 3.5–5.2)
PROT SERPL-MCNC: 6.5 G/DL (ref 6–8.5)
PROT UR QL STRIP: NEGATIVE
RBC # BLD AUTO: 4.01 10*6/MM3 (ref 3.77–5.28)
SODIUM SERPL-SCNC: 137 MMOL/L (ref 136–145)
SP GR UR STRIP: 1.02 (ref 1–1.03)
UROBILINOGEN UR QL STRIP: NORMAL
WBC NRBC COR # BLD: 9.27 10*3/MM3 (ref 3.4–10.8)

## 2023-10-28 PROCEDURE — 85025 COMPLETE CBC W/AUTO DIFF WBC: CPT | Performed by: STUDENT IN AN ORGANIZED HEALTH CARE EDUCATION/TRAINING PROGRAM

## 2023-10-28 PROCEDURE — 25510000001 IOPAMIDOL 61 % SOLUTION: Performed by: STUDENT IN AN ORGANIZED HEALTH CARE EDUCATION/TRAINING PROGRAM

## 2023-10-28 PROCEDURE — 74177 CT ABD & PELVIS W/CONTRAST: CPT

## 2023-10-28 PROCEDURE — 80053 COMPREHEN METABOLIC PANEL: CPT | Performed by: STUDENT IN AN ORGANIZED HEALTH CARE EDUCATION/TRAINING PROGRAM

## 2023-10-28 PROCEDURE — 99285 EMERGENCY DEPT VISIT HI MDM: CPT

## 2023-10-28 PROCEDURE — 25010000002 KETOROLAC TROMETHAMINE PER 15 MG: Performed by: STUDENT IN AN ORGANIZED HEALTH CARE EDUCATION/TRAINING PROGRAM

## 2023-10-28 PROCEDURE — 84703 CHORIONIC GONADOTROPIN ASSAY: CPT | Performed by: STUDENT IN AN ORGANIZED HEALTH CARE EDUCATION/TRAINING PROGRAM

## 2023-10-28 PROCEDURE — 81003 URINALYSIS AUTO W/O SCOPE: CPT | Performed by: STUDENT IN AN ORGANIZED HEALTH CARE EDUCATION/TRAINING PROGRAM

## 2023-10-28 PROCEDURE — 83690 ASSAY OF LIPASE: CPT | Performed by: STUDENT IN AN ORGANIZED HEALTH CARE EDUCATION/TRAINING PROGRAM

## 2023-10-28 PROCEDURE — 96374 THER/PROPH/DIAG INJ IV PUSH: CPT

## 2023-10-28 RX ORDER — KETOROLAC TROMETHAMINE 30 MG/ML
30 INJECTION, SOLUTION INTRAMUSCULAR; INTRAVENOUS ONCE
Status: COMPLETED | OUTPATIENT
Start: 2023-10-28 | End: 2023-10-28

## 2023-10-28 RX ADMIN — KETOROLAC TROMETHAMINE 30 MG: 30 INJECTION, SOLUTION INTRAMUSCULAR; INTRAVENOUS at 21:51

## 2023-10-28 RX ADMIN — IOPAMIDOL 85 ML: 612 INJECTION, SOLUTION INTRAVENOUS at 20:38

## 2023-10-28 NOTE — ED NOTES
Right flank pain x 2 weeks - it has been radiating to right abd x 4 days. Hx kidney stones. She has a clear, stringy discharge and feels like she has to urinate all the time

## 2023-10-29 NOTE — DISCHARGE INSTRUCTIONS
Please follow-up with your primary care physician within 1 week for repeat evaluation and blood pressure check    Please return to the ER with new or worsening symptoms including uncontrolled pain, nausea, vomiting, fevers, chills

## 2023-10-29 NOTE — ED PROVIDER NOTES
EMERGENCY DEPARTMENT ENCOUNTER    Room Number:  09/09  PCP: Marcello Rowley MD  Historian: Patient      HPI:  Chief Complaint: Abdominal pain  Context: Eula Yi is a 38 y.o. female who presents to the ED c/o abdominal pain.  Patient states she began having flank pain approximately 1 week ago and developed abdominal pain approximately 3 days ago.  Patient states pain is located in the right lower quadrant but radiates throughout her abdomen.  Patient does have a history of kidney stones but denies previous abdominal surgery.  Patient states she been very gassy and feeling bloated.  Patient has noted some thin mucousy discharge in her underwear but is uncertain if it is coming from her rectum or vagina.  Patient has no concern for sexually transmitted infection at this time.  Patient denies dysuria or hematuria.            PAST MEDICAL HISTORY  Active Ambulatory Problems     Diagnosis Date Noted    Amenorrhea-hyperprolactinemia syndrome 11/22/2017     Resolved Ambulatory Problems     Diagnosis Date Noted    No Resolved Ambulatory Problems     Past Medical History:   Diagnosis Date    Amenorrhea     Disease of thyroid gland     Kidney stones          PAST SURGICAL HISTORY  Past Surgical History:   Procedure Laterality Date    KIDNEY STONE SURGERY      NEPHRECTOMY      SINUS SURGERY      WISDOM TOOTH EXTRACTION           FAMILY HISTORY  Family History   Problem Relation Age of Onset    Depression Mother     Kidney disease Mother     Breast cancer Maternal Grandmother 75         SOCIAL HISTORY  Social History     Socioeconomic History    Marital status: Single    Number of children: 2   Tobacco Use    Smoking status: Every Day     Packs/day: 1     Types: Cigarettes   Substance and Sexual Activity    Alcohol use: Yes     Comment: Occ.    Drug use: No    Sexual activity: Not Currently     Birth control/protection: None         ALLERGIES  Penicillins and Shellfish-derived products        REVIEW OF SYSTEMS  Review  of Systems   Gastrointestinal:  Positive for abdominal pain and nausea.   Genitourinary:  Positive for flank pain.   All other systems reviewed and are negative.           PHYSICAL EXAM  ED Triage Vitals   Temp Heart Rate Resp BP SpO2   10/28/23 1853 10/28/23 1853 10/28/23 1853 10/28/23 1932 10/28/23 1853   97.6 °F (36.4 °C) 86 16 127/78 100 %      Temp src Heart Rate Source Patient Position BP Location FiO2 (%)   10/28/23 1853 10/28/23 1853 -- -- --   Tympanic Monitor          Physical Exam      GENERAL: no acute distress  HENT: nares patent  EYES: no scleral icterus  CV: regular rhythm, normal rate  RESPIRATORY: normal effort  ABDOMEN: soft.  Tenderness to palpation in the right lower quadrant, soft, nondistended  MUSCULOSKELETAL: no deformity  NEURO: alert, moves all extremities, follows commands  PSYCH:  calm, cooperative  SKIN: warm, dry    Vital signs and nursing notes reviewed.          LAB RESULTS  Recent Results (from the past 24 hour(s))   Comprehensive Metabolic Panel    Collection Time: 10/28/23  7:35 PM    Specimen: Blood   Result Value Ref Range    Glucose 101 (H) 65 - 99 mg/dL    BUN 7 6 - 20 mg/dL    Creatinine 0.76 0.57 - 1.00 mg/dL    Sodium 137 136 - 145 mmol/L    Potassium 4.2 3.5 - 5.2 mmol/L    Chloride 102 98 - 107 mmol/L    CO2 27.0 22.0 - 29.0 mmol/L    Calcium 10.0 8.6 - 10.5 mg/dL    Total Protein 6.5 6.0 - 8.5 g/dL    Albumin 4.4 3.5 - 5.2 g/dL    ALT (SGPT) 8 1 - 33 U/L    AST (SGOT) 15 1 - 32 U/L    Alkaline Phosphatase 56 39 - 117 U/L    Total Bilirubin 0.4 0.0 - 1.2 mg/dL    Globulin 2.1 gm/dL    A/G Ratio 2.1 g/dL    BUN/Creatinine Ratio 9.2 7.0 - 25.0    Anion Gap 8.0 5.0 - 15.0 mmol/L    eGFR 103.0 >60.0 mL/min/1.73   Lipase    Collection Time: 10/28/23  7:35 PM    Specimen: Blood   Result Value Ref Range    Lipase 19 13 - 60 U/L   hCG, Serum, Qualitative    Collection Time: 10/28/23  7:35 PM    Specimen: Blood   Result Value Ref Range    HCG Qualitative Negative Negative    Urinalysis With Culture If Indicated - Urine, Clean Catch    Collection Time: 10/28/23  7:35 PM    Specimen: Urine, Clean Catch   Result Value Ref Range    Color, UA Yellow Yellow, Straw    Appearance, UA Clear Clear    pH, UA 5.5 5.0 - 8.0    Specific Gravity, UA 1.025 1.005 - 1.030    Glucose, UA Negative Negative    Ketones, UA Negative Negative    Bilirubin, UA Negative Negative    Blood, UA Negative Negative    Protein, UA Negative Negative    Leuk Esterase, UA Negative Negative    Nitrite, UA Negative Negative    Urobilinogen, UA 0.2 E.U./dL 0.2 - 1.0 E.U./dL   CBC Auto Differential    Collection Time: 10/28/23  7:35 PM    Specimen: Blood   Result Value Ref Range    WBC 9.27 3.40 - 10.80 10*3/mm3    RBC 4.01 3.77 - 5.28 10*6/mm3    Hemoglobin 13.5 12.0 - 15.9 g/dL    Hematocrit 40.4 34.0 - 46.6 %    .7 (H) 79.0 - 97.0 fL    MCH 33.7 (H) 26.6 - 33.0 pg    MCHC 33.4 31.5 - 35.7 g/dL    RDW 12.7 12.3 - 15.4 %    RDW-SD 47.7 37.0 - 54.0 fl    MPV 10.0 6.0 - 12.0 fL    Platelets 301 140 - 450 10*3/mm3    Neutrophil % 42.2 (L) 42.7 - 76.0 %    Lymphocyte % 43.0 19.6 - 45.3 %    Monocyte % 7.2 5.0 - 12.0 %    Eosinophil % 6.3 (H) 0.3 - 6.2 %    Basophil % 1.1 0.0 - 1.5 %    Immature Grans % 0.2 0.0 - 0.5 %    Neutrophils, Absolute 3.91 1.70 - 7.00 10*3/mm3    Lymphocytes, Absolute 3.99 (H) 0.70 - 3.10 10*3/mm3    Monocytes, Absolute 0.67 0.10 - 0.90 10*3/mm3    Eosinophils, Absolute 0.58 (H) 0.00 - 0.40 10*3/mm3    Basophils, Absolute 0.10 0.00 - 0.20 10*3/mm3    Immature Grans, Absolute 0.02 0.00 - 0.05 10*3/mm3    nRBC 0.0 0.0 - 0.2 /100 WBC       Ordered the above labs and reviewed the results.        RADIOLOGY  CT Abdomen Pelvis With Contrast    Result Date: 10/28/2023  CT OF THE ABDOMEN AND PELVIS WITH CONTRAST  HISTORY: Abdominal pain  COMPARISON: None available.  TECHNIQUE: Axial CT imaging was obtained through the abdomen and pelvis. IV contrast was administered.  FINDINGS: Images through the lung  bases demonstrate dependent atelectasis. No suspicious hepatic lesions are seen. The gallbladder, adrenal glands, spleen, stomach, duodenum, and pancreas appear normal. There are bilateral nonobstructing renal stones. Stone on the right measures 4 mm. Larger stone on the left measures 3 mm. No hydronephrosis is seen on either side. The kidneys enhance symmetrically. There are some aortoiliac calcifications. Uterus is within normal limits for a 38-year-old woman. There is a left ovarian cyst, which measures 2.2 x 1.9 cm. There is no bowel obstruction. I don't see any evidence of appendicitis. No acute osseous abnormalities are seen.       1. Bilateral nonobstructing renal stones. 2. Left ovarian cyst. 3. I'm not completely convinced I can see the appendix but I don't see any clear evidence of appendicitis.  Radiation dose reduction techniques were utilized, including automated exposure control and exposure modulation based on body size.   This report was finalized on 10/28/2023 9:21 PM by Dr. Radha Morales M.D on Workstation: [id number]       Ordered the above noted radiological studies. Reviewed by me in PACS.            MEDICATIONS GIVEN IN ER  Medications   ketorolac (TORADOL) injection 30 mg (has no administration in time range)   iopamidol (ISOVUE-300) 61 % injection 100 mL (85 mL Intravenous Given by Other 10/28/23 2038)                   MEDICAL DECISION MAKING, PROGRESS, and CONSULTS    All labs have been independently reviewed by me.  All radiology studies have been reviewed by me and I have also reviewed the radiology report.   EKG's independently viewed and interpreted by me.  Discussion below represents my analysis of pertinent findings related to patient's condition, differential diagnosis, treatment plan and final disposition.      Additional sources:      - Shared decision making: Utilizing shared decision-making techniques we discussed today's findings and plan moving forward.  At this time  elected.  See you outpatient evaluation.      Orders placed during this visit:  Orders Placed This Encounter   Procedures    CT Abdomen Pelvis With Contrast    Comprehensive Metabolic Panel    Lipase    hCG, Serum, Qualitative    Urinalysis With Culture If Indicated - Urine, Clean Catch    CBC Auto Differential    CBC & Differential       Differential diagnosis includes but is not limited to:    UTI, irritable bowel syndrome, gastroenteritis, appendicitis      Independent interpretation of labs, radiology studies, and discussions with consultants:  ED Course as of 10/28/23 2146   Sat Oct 28, 2023   2145 CT abdomen interpreted by me and demonstrates no ureteral calculus, free air or evidence of bowel obstruction [MW]   2146 Laboratory evaluation is overall unremarkable.  Patient be treated symptomatically and counseled to follow-up with primary care for further evaluation [MW]      ED Course User Index  [MW] Quinton Reddy MD       DIAGNOSIS  Final diagnoses:   Right lower quadrant abdominal pain         DISPOSITION  DISCHARGE    Patient discharged in stable condition.    Reviewed implications of results, diagnosis, meds, responsibility to follow up, warning signs and symptoms of possible worsening, potential complications and reasons to return to ER, including uncontrolled pain, nausea, vomiting, fevers, chills.    Patient/Family voiced understanding of above instructions.    Discussed plan for discharge, as there is no emergent indication for admission. Patient referred to primary care provider for BP management due to today's BP. Pt/family is agreeable and understands need for follow up and repeat testing.  Pt is aware that discharge does not mean that nothing is wrong but it indicates no emergency is present that requires admission and they must continue care with follow-up as given below or physician of their choice.     FOLLOW-UP  Marcello Rowley MD  [facility]  [address]  [phone]    In 1 week           Medication List      No changes were made to your prescriptions during this visit.                  Latest Documented Vital Signs:  As of 21:46 EDT  BP- 113/91 HR- 69 Temp- 97.6 °F (36.4 °C) (Tympanic) O2 sat- 100%              --    Please note that portions of this were completed with a voice recognition program.       Note Disclaimer: At [facility], we believe that sharing information builds trust and better relationships. You are receiving this note because you are receiving care at [facility] or recently visited. It is possible you will see health information before a provider has talked with you about it. This kind of information can be easy to misunderstand. To help you fully understand what it means for your health, we urge you to discuss this note with your provider.             Quinton Reddy MD  10/28/23 [id number]

## 2024-11-19 ENCOUNTER — HOSPITAL ENCOUNTER (EMERGENCY)
Facility: HOSPITAL | Age: 39
Discharge: HOME | End: 2024-11-19
Attending: EMERGENCY MEDICINE
Payer: COMMERCIAL

## 2024-11-19 ENCOUNTER — APPOINTMENT (OUTPATIENT)
Dept: GENERAL RADIOLOGY | Facility: HOSPITAL | Age: 39
End: 2024-11-19
Payer: COMMERCIAL

## 2024-11-19 VITALS
HEART RATE: 59 BPM | RESPIRATION RATE: 18 BRPM | WEIGHT: 90.8 LBS | DIASTOLIC BLOOD PRESSURE: 73 MMHG | TEMPERATURE: 98.2 F | BODY MASS INDEX: 16.09 KG/M2 | SYSTOLIC BLOOD PRESSURE: 109 MMHG | OXYGEN SATURATION: 97 % | HEIGHT: 63 IN

## 2024-11-19 DIAGNOSIS — M54.50 ACUTE BILATERAL LOW BACK PAIN WITHOUT SCIATICA: Primary | ICD-10-CM

## 2024-11-19 PROCEDURE — 25010000002 KETOROLAC TROMETHAMINE PER 15 MG: Performed by: PHYSICIAN ASSISTANT

## 2024-11-19 PROCEDURE — 96372 THER/PROPH/DIAG INJ SC/IM: CPT

## 2024-11-19 PROCEDURE — 99283 EMERGENCY DEPT VISIT LOW MDM: CPT

## 2024-11-19 PROCEDURE — 72110 X-RAY EXAM L-2 SPINE 4/>VWS: CPT

## 2024-11-19 RX ORDER — KETOROLAC TROMETHAMINE 30 MG/ML
30 INJECTION, SOLUTION INTRAMUSCULAR; INTRAVENOUS ONCE
Status: COMPLETED | OUTPATIENT
Start: 2024-11-19 | End: 2024-11-19

## 2024-11-19 RX ORDER — METHOCARBAMOL 750 MG/1
1500 TABLET, FILM COATED ORAL ONCE
Status: COMPLETED | OUTPATIENT
Start: 2024-11-19 | End: 2024-11-19

## 2024-11-19 RX ORDER — METHOCARBAMOL 750 MG/1
TABLET, FILM COATED ORAL
Qty: 45 TABLET | Refills: 0 | Status: SHIPPED | OUTPATIENT
Start: 2024-11-19

## 2024-11-19 RX ORDER — METHYLPREDNISOLONE 4 MG/1
TABLET ORAL
Qty: 21 TABLET | Refills: 0 | Status: SHIPPED | OUTPATIENT
Start: 2024-11-19

## 2024-11-19 RX ADMIN — KETOROLAC TROMETHAMINE 30 MG: 30 INJECTION, SOLUTION INTRAMUSCULAR at 12:15

## 2024-11-19 RX ADMIN — METHOCARBAMOL 1500 MG: 750 TABLET ORAL at 12:15

## 2024-11-19 NOTE — ED PROVIDER NOTES
Pt presents to the ED c/o 1 to 2 weeks of low back pain without radiation.  No numbness or weakness of the legs.  No fall or injury.     On exam,   General: No acute distress, nontoxic  HEENT: EOMI  Pulm: Symmetric chest rise, nonlabored breathing  CV: Regular rate and rhythm  GI: Nondistended  MSK: No deformity, nontender to palpation  Skin: Warm, dry  Neuro: Awake, alert, oriented x 4, moving all extremities, no focal deficits  Psych: Calm, cooperative    Vital signs and nursing notes reviewed.           Plan: Plan for lumbar x-ray, supportive care, reevaluation with results.  Suspect lumbosacral strain versus spasm.     ED Course as of 11/19/24 1347   Tue Nov 19, 2024   1322 XR Spine Lumbar Complete 4+VW  Radiology study independently interpreted by me and my findings are no acute fracture.   [DC]   1338 Pt is feeling somewhat improved. She has no fever and no significant risk factors for infectious etiology. Will treat with steroid taper, muscle relaxer at home. Discussed ER return precautions. [DC]      ED Course User Index  [DC] Norma Martin PA       Reassuring nonemergent findings today, safe for discharge with outpatient supportive care and outpatient follow-up as needed.  ED return for worsening symptoms as needed.     MD Attestation Note    SHARED VISIT: This visit was performed by BOTH a physician and an APC. The substantive portion of the medical decision making was performed by this attesting physician who made or approved the management plan and takes responsibility for patient management. All studies in the APC note (if performed) were independently interpreted by me.                   Marco Antonio Raphael MD  11/19/24 3783

## 2024-11-19 NOTE — ED PROVIDER NOTES
EMERGENCY DEPARTMENT ENCOUNTER      PCP: Marcello Rowley MD  Patient Care Team:  Marcello Rowley MD as PCP - General (Family Medicine)   Independent Historians: Patient    HPI:  Chief Complaint: Back pain   A complete HPI/ROS/PMH/PSH/SH/FH are unobtainable due to: None    Chronic or social conditions impacting patient care (social determinants of health): None    Context: Eula Yi is a 39 y.o. female who presents to the ED c/o acute lower midline back pain that began about 1-2 weeks ago. She denies fall, injury, heavy lifting, numbness, weakness, or urinary symptoms. She denies fevers, chills, hx spinal surgery. Pt reports taking tylenol and ibuprofen together seems to help some but she has also tried heating pad, lidocaine patches without relief.    Review of prior external notes and/or external test results outside of this encounter: 10/28/23 CMP was unremarkable. CBC from same date shows normal hemoglobin.      PAST MEDICAL HISTORY  Active Ambulatory Problems     Diagnosis Date Noted    Amenorrhea-hyperprolactinemia syndrome 11/22/2017     Resolved Ambulatory Problems     Diagnosis Date Noted    No Resolved Ambulatory Problems     Past Medical History:   Diagnosis Date    Amenorrhea     Disease of thyroid gland     Kidney stones        The patient qualifies to receive the vaccine, but they have not yet received it.    PAST SURGICAL HISTORY  Past Surgical History:   Procedure Laterality Date    KIDNEY STONE SURGERY      NEPHRECTOMY      SINUS SURGERY      WISDOM TOOTH EXTRACTION           FAMILY HISTORY  Family History   Problem Relation Age of Onset    Depression Mother     Kidney disease Mother     Breast cancer Maternal Grandmother 75         SOCIAL HISTORY  Social History     Socioeconomic History    Marital status: Single    Number of children: 2   Tobacco Use    Smoking status: Every Day     Current packs/day: 1.00     Types: Cigarettes   Substance and Sexual Activity    Alcohol use: Yes      Comment: Occ.    Drug use: No    Sexual activity: Not Currently     Birth control/protection: None         ALLERGIES  Penicillins and Shellfish-derived products        REVIEW OF SYSTEMS  Review of Systems   Constitutional:  Negative for fever.   Cardiovascular:  Negative for leg swelling.   Genitourinary:  Negative for dysuria.   Musculoskeletal:  Positive for back pain.   Neurological:  Negative for weakness and numbness.        All systems reviewed and negative except for those discussed in HPI.       PHYSICAL EXAM    I have reviewed the triage vital signs and nursing notes.    ED Triage Vitals   Temp Heart Rate Resp BP SpO2   11/19/24 1152 11/19/24 1152 11/19/24 1152 11/19/24 1158 11/19/24 1152   98.2 °F (36.8 °C) 120 20 128/79 98 %      Temp src Heart Rate Source Patient Position BP Location FiO2 (%)   11/19/24 1152 11/19/24 1158 11/19/24 1158 11/19/24 1158 --   Tympanic Monitor Lying Right arm        Physical Exam  GENERAL: alert, no acute distress  SKIN: Warm, dry, no rash  HENT: Normocephalic, atraumatic  EYES: no scleral icterus  CV: regular rhythm, regular rate  RESPIRATORY: normal effort, lungs clear  ABDOMEN: soft, nontender, nondistended  MUSCULOSKELETAL: no deformity, generalized lumbosacral back pain on palpation, no overlying skin changes  NEURO: alert, moves all extremities, follows commands, no focal deficits          LAB RESULTS  No results found for this or any previous visit (from the past 24 hours).    Ordered the above labs and independently reviewed and interpreted the results.        RADIOLOGY  XR Spine Lumbar Complete 4+VW   Final Result    FINDINGS:     No acute fracture or malalignment is identified. Vertebral body and disc  space heights appear preserved. If further imaging evaluation is  indicated, MRI could be considered. Aortic calcifications are apparent,  unusual for patient age.   As described.               This report was finalized on 11/19/2024 1:07 PM by Dr. Héctor Walker,  M.D on Workstation: [id number]              I ordered the above noted radiological studies. Independently reviewed and interpreted by me.  See dictation for official radiology interpretation.      PROCEDURES    Procedures      MEDICATIONS GIVEN IN ER    Medications   methocarbamol (ROBAXIN) tablet 1,500 mg (1,500 mg Oral Given 11/19/24 1215)   ketorolac (TORADOL) injection 30 mg (30 mg Intramuscular Given 11/19/24 1215)         PROGRESS, DATA ANALYSIS, CONSULTS, AND MEDICAL DECISION MAKING    All labs have been independently reviewed and interpreted by me.  All radiology studies have been independently reviewed and interpreted by me and discussed with radiologist dictating the report.   EKG's independently reviewed and interpreted by me.  Discussion below represents my analysis of pertinent findings related to patient's condition, differential diagnosis, treatment plan and final disposition.    My differential diagnosis for back pain includes but is not limited to:  Musculoskeletal strain, contusion, retroperitoneal hematoma, disc protrusion, vertebral fracture, transverse process fracture, rib fracture, facet syndrome, sacroiliac joint strain, sciatica, renal injury, splenic injury, pancreatic injury, osteoarthritis, lumbar spondylosis, spinal stenosis, ankylosing spondylitis, sacroiliac joint inflammation, pancreatitis, perforated peptic ulcer, diverticulitis, epidural abscess, osteomyelitis, retroperitoneal abscess, pyelonephritis, pneumonia, subphrenic abscess, tuberculosis, neurofibroma, meningioma, multiple myeloma, lymphoma, metastatic cancer, primary cancer, AAA, aortic dissection, spinal ischemia, referred pain, ureterolithiasis      ED Course as of 11/22/24 2201 Tue Nov 19, 2024   1322 XR Spine Lumbar Complete 4+VW  Radiology study independently interpreted by me and my findings are no acute fracture.   [DC]   1338 Pt is feeling somewhat improved. She has no fever and no significant risk factors for  infectious etiology. Will treat with steroid taper, muscle relaxer at home. Discussed ER return precautions. [DC]      ED Course User Index  [DC] Norma Martin PA             AS OF 22:01 EST VITALS:    BP - 109/73  HR - 59  TEMP - 98.2 °F (36.8 °C) (Tympanic)  O2 SATS - 97%        DIAGNOSIS  Final diagnoses:   Acute bilateral low back pain without sciatica         DISPOSITION  ED Disposition       ED Disposition   Discharge    Condition   Stable    Comment   --                  Note Disclaimer: At [facility], we believe that sharing information builds trust and better relationships. You are receiving this note because you recently visited [facility]. It is possible you will see health information before a provider has talked with you about it. This kind of information can be easy to misunderstand. To help you fully understand what it means for your health, we urge you to discuss this note with your provider.         Norma Martin PA  11/22/24 0868